# Patient Record
Sex: FEMALE | Race: OTHER | HISPANIC OR LATINO | URBAN - METROPOLITAN AREA
[De-identification: names, ages, dates, MRNs, and addresses within clinical notes are randomized per-mention and may not be internally consistent; named-entity substitution may affect disease eponyms.]

---

## 2019-04-17 ENCOUNTER — EMERGENCY (EMERGENCY)
Facility: HOSPITAL | Age: 29
LOS: 0 days | Discharge: HOME | End: 2019-04-17
Attending: EMERGENCY MEDICINE | Admitting: EMERGENCY MEDICINE
Payer: MEDICAID

## 2019-04-17 VITALS
SYSTOLIC BLOOD PRESSURE: 107 MMHG | HEART RATE: 74 BPM | DIASTOLIC BLOOD PRESSURE: 71 MMHG | OXYGEN SATURATION: 100 % | TEMPERATURE: 98 F | RESPIRATION RATE: 18 BRPM

## 2019-04-17 VITALS — HEIGHT: 60 IN | WEIGHT: 138.89 LBS

## 2019-04-17 DIAGNOSIS — S62.661B NONDISPLACED FRACTURE OF DISTAL PHALANX OF LEFT INDEX FINGER, INITIAL ENCOUNTER FOR OPEN FRACTURE: ICD-10-CM
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
## 2019-04-17 PROCEDURE — 29130 APPL FINGER SPLINT STATIC: CPT | Mod: F1

## 2019-04-17 PROCEDURE — 73140 X-RAY EXAM OF FINGER(S): CPT | Mod: 26,LT

## 2019-04-17 PROCEDURE — 99283 EMERGENCY DEPT VISIT LOW MDM: CPT | Mod: 25

## 2019-04-17 RX ORDER — TETANUS TOXOID, REDUCED DIPHTHERIA TOXOID AND ACELLULAR PERTUSSIS VACCINE, ADSORBED 5; 2.5; 8; 8; 2.5 [IU]/.5ML; [IU]/.5ML; UG/.5ML; UG/.5ML; UG/.5ML
0.5 SUSPENSION INTRAMUSCULAR ONCE
Qty: 0 | Refills: 0 | Status: COMPLETED | OUTPATIENT
Start: 2019-04-17 | End: 2019-04-17

## 2019-04-17 RX ORDER — CEPHALEXIN 500 MG
1 CAPSULE ORAL
Qty: 21 | Refills: 0 | OUTPATIENT
Start: 2019-04-17 | End: 2019-04-23

## 2019-04-17 RX ORDER — CEPHALEXIN 500 MG
500 CAPSULE ORAL ONCE
Qty: 0 | Refills: 0 | Status: COMPLETED | OUTPATIENT
Start: 2019-04-17 | End: 2019-04-17

## 2019-04-17 RX ADMIN — TETANUS TOXOID, REDUCED DIPHTHERIA TOXOID AND ACELLULAR PERTUSSIS VACCINE, ADSORBED 0.5 MILLILITER(S): 5; 2.5; 8; 8; 2.5 SUSPENSION INTRAMUSCULAR at 21:36

## 2019-04-17 RX ADMIN — Medication 500 MILLIGRAM(S): at 21:53

## 2019-04-17 NOTE — ED STATDOCS - ATTENDING CONTRIBUTION TO CARE
Attending Contribution to Care: I, Suzan Rand, performed the initial face to face bedside interview with this patient regarding history of present illness, review of symptoms and relevant past medical, social and family history.  I completed an independent physical examination.  I was the initial provider who evaluated this patient and the history, physical, and MDM reflect this intial assessment. I have signed out the follow up of any pending tests after the original (i.e. labs, radiological studies) to the ACP with instructions to review any with instructions to review any concerning findings to me prior to discharge.  I have communicated the patient’s plan of care and disposition with the ACP.

## 2019-04-17 NOTE — ED STATDOCS - CARE PLAN
Principal Discharge DX:	Open nondisplaced fracture of distal phalanx of left index finger, initial encounter

## 2019-04-17 NOTE — ED STATDOCS - SKIN, MLM
+left index finger with subungual hematoma less than 50%, distal swelling with lac and visualized metal piece.

## 2019-04-17 NOTE — ED STATDOCS - PROGRESS NOTE DETAILS
XR with L 2nd finger distal tuft fx, cleaned, no active bleeding (injury >24 hours ago) dressed placed in finger splint, Tdap given, will start keflex, wound care and S&S of infection discussed, plan to d/c home with outpt f/u with ortho hand, pt agreeable to d/c and plan of care, all questions answered, return precautions given   [id number] used   Payton Amezquita PA-C

## 2019-04-17 NOTE — ED STATDOCS - CLINICAL SUMMARY MEDICAL DECISION MAKING FREE TEXT BOX
Plan for X-Ray, lac repair. Plan for X-Ray, lac repair.  XR with 2nd finger distal tuft fx, cleaned, splinted, plan to d/c home with ortho hand f/u

## 2019-04-17 NOTE — ED ADULT NURSE NOTE - NSIMPLEMENTINTERV_GEN_ALL_ED
Implemented All Universal Safety Interventions:  [place] to call system. Call bell, personal items and telephone within reach. Instruct patient to call for assistance. Room bathroom lighting operational. Non-slip footwear when patient is off stretcher. Physically safe environment: no spills, clutter or unnecessary equipment. Stretcher in lowest position, wheels locked, appropriate side rails in place.

## 2019-04-17 NOTE — ED STATDOCS - OBJECTIVE STATEMENT
27 y/o female with no pertinent PMHx presents to the ED c/o staple injury to left index finger with laceration that happened last night. A box with a staple fell onto pt's hand and cut it and there is a visible metal piece in finger. Tetanus is not UTD.

## 2019-06-06 ENCOUNTER — EMERGENCY (EMERGENCY)
Facility: HOSPITAL | Age: 29
LOS: 0 days | Discharge: HOME | End: 2019-06-06
Attending: EMERGENCY MEDICINE | Admitting: EMERGENCY MEDICINE
Payer: MEDICAID

## 2019-06-06 VITALS — WEIGHT: 139.99 LBS | HEIGHT: 61 IN

## 2019-06-06 VITALS
RESPIRATION RATE: 17 BRPM | SYSTOLIC BLOOD PRESSURE: 94 MMHG | HEART RATE: 70 BPM | OXYGEN SATURATION: 98 % | DIASTOLIC BLOOD PRESSURE: 66 MMHG | TEMPERATURE: 97 F

## 2019-06-06 DIAGNOSIS — R11.2 NAUSEA WITH VOMITING, UNSPECIFIED: ICD-10-CM

## 2019-06-06 DIAGNOSIS — Z90.49 ACQUIRED ABSENCE OF OTHER SPECIFIED PARTS OF DIGESTIVE TRACT: Chronic | ICD-10-CM

## 2019-06-06 DIAGNOSIS — R10.13 EPIGASTRIC PAIN: ICD-10-CM

## 2019-06-06 DIAGNOSIS — K52.9 NONINFECTIVE GASTROENTERITIS AND COLITIS, UNSPECIFIED: ICD-10-CM

## 2019-06-06 LAB
ALBUMIN SERPL ELPH-MCNC: 3.6 G/DL — SIGNIFICANT CHANGE UP (ref 3.3–5)
ALP SERPL-CCNC: 67 U/L — SIGNIFICANT CHANGE UP (ref 40–120)
ALT FLD-CCNC: 20 U/L — SIGNIFICANT CHANGE UP (ref 12–78)
ANION GAP SERPL CALC-SCNC: 10 MMOL/L — SIGNIFICANT CHANGE UP (ref 5–17)
APPEARANCE UR: CLEAR — SIGNIFICANT CHANGE UP
AST SERPL-CCNC: 17 U/L — SIGNIFICANT CHANGE UP (ref 15–37)
BACTERIA # UR AUTO: ABNORMAL
BASOPHILS # BLD AUTO: 0.04 K/UL — SIGNIFICANT CHANGE UP (ref 0–0.2)
BASOPHILS NFR BLD AUTO: 0.3 % — SIGNIFICANT CHANGE UP (ref 0–2)
BILIRUB SERPL-MCNC: 0.2 MG/DL — SIGNIFICANT CHANGE UP (ref 0.2–1.2)
BILIRUB UR-MCNC: NEGATIVE — SIGNIFICANT CHANGE UP
BUN SERPL-MCNC: 7 MG/DL — SIGNIFICANT CHANGE UP (ref 7–23)
CALCIUM SERPL-MCNC: 9.3 MG/DL — SIGNIFICANT CHANGE UP (ref 8.5–10.1)
CHLORIDE SERPL-SCNC: 106 MMOL/L — SIGNIFICANT CHANGE UP (ref 96–108)
CO2 SERPL-SCNC: 23 MMOL/L — SIGNIFICANT CHANGE UP (ref 22–31)
COLOR SPEC: YELLOW — SIGNIFICANT CHANGE UP
CREAT SERPL-MCNC: 0.66 MG/DL — SIGNIFICANT CHANGE UP (ref 0.5–1.3)
DIFF PNL FLD: NEGATIVE — SIGNIFICANT CHANGE UP
EOSINOPHIL # BLD AUTO: 0.14 K/UL — SIGNIFICANT CHANGE UP (ref 0–0.5)
EOSINOPHIL NFR BLD AUTO: 1 % — SIGNIFICANT CHANGE UP (ref 0–6)
EPI CELLS # UR: SIGNIFICANT CHANGE UP
GLUCOSE SERPL-MCNC: 104 MG/DL — HIGH (ref 70–99)
GLUCOSE UR QL: NEGATIVE MG/DL — SIGNIFICANT CHANGE UP
HCG SERPL-ACNC: <1 MIU/ML — SIGNIFICANT CHANGE UP
HCT VFR BLD CALC: 35 % — SIGNIFICANT CHANGE UP (ref 34.5–45)
HGB BLD-MCNC: 12 G/DL — SIGNIFICANT CHANGE UP (ref 11.5–15.5)
IMM GRANULOCYTES NFR BLD AUTO: 0.4 % — SIGNIFICANT CHANGE UP (ref 0–1.5)
KETONES UR-MCNC: NEGATIVE — SIGNIFICANT CHANGE UP
LEUKOCYTE ESTERASE UR-ACNC: ABNORMAL
LIDOCAIN IGE QN: 100 U/L — SIGNIFICANT CHANGE UP (ref 73–393)
LYMPHOCYTES # BLD AUTO: 33.8 % — SIGNIFICANT CHANGE UP (ref 13–44)
LYMPHOCYTES # BLD AUTO: 4.54 K/UL — HIGH (ref 1–3.3)
MCHC RBC-ENTMCNC: 30.4 PG — SIGNIFICANT CHANGE UP (ref 27–34)
MCHC RBC-ENTMCNC: 34.3 GM/DL — SIGNIFICANT CHANGE UP (ref 32–36)
MCV RBC AUTO: 88.6 FL — SIGNIFICANT CHANGE UP (ref 80–100)
MONOCYTES # BLD AUTO: 1.11 K/UL — HIGH (ref 0–0.9)
MONOCYTES NFR BLD AUTO: 8.3 % — SIGNIFICANT CHANGE UP (ref 2–14)
NEUTROPHILS # BLD AUTO: 7.57 K/UL — HIGH (ref 1.8–7.4)
NEUTROPHILS NFR BLD AUTO: 56.2 % — SIGNIFICANT CHANGE UP (ref 43–77)
NITRITE UR-MCNC: NEGATIVE — SIGNIFICANT CHANGE UP
PH UR: 6.5 — SIGNIFICANT CHANGE UP (ref 5–8)
PLATELET # BLD AUTO: 384 K/UL — SIGNIFICANT CHANGE UP (ref 150–400)
POTASSIUM SERPL-MCNC: 3.3 MMOL/L — LOW (ref 3.5–5.3)
POTASSIUM SERPL-SCNC: 3.3 MMOL/L — LOW (ref 3.5–5.3)
PROT SERPL-MCNC: 7.9 GM/DL — SIGNIFICANT CHANGE UP (ref 6–8.3)
PROT UR-MCNC: NEGATIVE MG/DL — SIGNIFICANT CHANGE UP
RBC # BLD: 3.95 M/UL — SIGNIFICANT CHANGE UP (ref 3.8–5.2)
RBC # FLD: 12 % — SIGNIFICANT CHANGE UP (ref 10.3–14.5)
RBC CASTS # UR COMP ASSIST: SIGNIFICANT CHANGE UP /HPF (ref 0–4)
SODIUM SERPL-SCNC: 139 MMOL/L — SIGNIFICANT CHANGE UP (ref 135–145)
SP GR SPEC: 1.01 — SIGNIFICANT CHANGE UP (ref 1.01–1.02)
UROBILINOGEN FLD QL: NEGATIVE MG/DL — SIGNIFICANT CHANGE UP
WBC # BLD: 13.45 K/UL — HIGH (ref 3.8–10.5)
WBC # FLD AUTO: 13.45 K/UL — HIGH (ref 3.8–10.5)
WBC UR QL: SIGNIFICANT CHANGE UP

## 2019-06-06 PROCEDURE — 99284 EMERGENCY DEPT VISIT MOD MDM: CPT

## 2019-06-06 PROCEDURE — 74177 CT ABD & PELVIS W/CONTRAST: CPT | Mod: 26

## 2019-06-06 PROCEDURE — 93010 ELECTROCARDIOGRAM REPORT: CPT

## 2019-06-06 RX ORDER — ONDANSETRON 8 MG/1
4 TABLET, FILM COATED ORAL ONCE
Refills: 0 | Status: COMPLETED | OUTPATIENT
Start: 2019-06-06 | End: 2019-06-06

## 2019-06-06 RX ORDER — SODIUM CHLORIDE 9 MG/ML
1000 INJECTION INTRAMUSCULAR; INTRAVENOUS; SUBCUTANEOUS ONCE
Refills: 0 | Status: COMPLETED | OUTPATIENT
Start: 2019-06-06 | End: 2019-06-06

## 2019-06-06 RX ORDER — FAMOTIDINE 10 MG/ML
20 INJECTION INTRAVENOUS ONCE
Refills: 0 | Status: COMPLETED | OUTPATIENT
Start: 2019-06-06 | End: 2019-06-06

## 2019-06-06 RX ORDER — KETOROLAC TROMETHAMINE 30 MG/ML
30 SYRINGE (ML) INJECTION ONCE
Refills: 0 | Status: DISCONTINUED | OUTPATIENT
Start: 2019-06-06 | End: 2019-06-06

## 2019-06-06 RX ADMIN — SODIUM CHLORIDE 1000 MILLILITER(S): 9 INJECTION INTRAMUSCULAR; INTRAVENOUS; SUBCUTANEOUS at 01:11

## 2019-06-06 RX ADMIN — Medication 30 MILLILITER(S): at 01:11

## 2019-06-06 RX ADMIN — Medication 30 MILLIGRAM(S): at 04:22

## 2019-06-06 RX ADMIN — FAMOTIDINE 20 MILLIGRAM(S): 10 INJECTION INTRAVENOUS at 01:11

## 2019-06-06 RX ADMIN — ONDANSETRON 4 MILLIGRAM(S): 8 TABLET, FILM COATED ORAL at 01:10

## 2019-06-06 RX ADMIN — SODIUM CHLORIDE 1000 MILLILITER(S): 9 INJECTION INTRAMUSCULAR; INTRAVENOUS; SUBCUTANEOUS at 02:11

## 2019-06-06 NOTE — ED PROVIDER NOTE - OBJECTIVE STATEMENT
29 yo female with ho cholecystectomy pw epigastric pain with nausea and no vmoiting for 1 day no fever. no known sick contacts

## 2019-06-06 NOTE — ED ADULT NURSE NOTE - OBJECTIVE STATEMENT
Pt presents to ED c/o epigastric pain that radiated to the back, Nausea diarrhea which started around 9pm. Pt reports fainting at home. Denies SOB, dizziness, fever chills.

## 2019-06-06 NOTE — ED PROVIDER NOTE - CARE PROVIDER_API CALL
Mai Gastelum)  Gastroenterology; Internal Medicine  [address] [place] [address]  Phone: [phone]  Fax: [phone]  Follow Up Time:

## 2019-06-06 NOTE — ED ADULT TRIAGE NOTE - CHIEF COMPLAINT QUOTE
pt c/o abdominal pain that radiates to her back. +nausea +vomiting. pt denies fevers at home. pt last took Acetaminophen around 8pm, no relief.

## 2019-06-07 PROBLEM — Z78.9 OTHER SPECIFIED HEALTH STATUS: Chronic | Status: ACTIVE | Noted: 2019-04-22

## 2019-11-01 ENCOUNTER — EMERGENCY (EMERGENCY)
Facility: HOSPITAL | Age: 29
LOS: 0 days | Discharge: HOME | End: 2019-11-01
Attending: FAMILY MEDICINE
Payer: MEDICAID

## 2019-11-01 VITALS — WEIGHT: 149.91 LBS

## 2019-11-01 VITALS
RESPIRATION RATE: 18 BRPM | OXYGEN SATURATION: 100 % | HEART RATE: 60 BPM | TEMPERATURE: 99 F | SYSTOLIC BLOOD PRESSURE: 107 MMHG

## 2019-11-01 DIAGNOSIS — Z87.19 PERSONAL HISTORY OF OTHER DISEASES OF THE DIGESTIVE SYSTEM: ICD-10-CM

## 2019-11-01 DIAGNOSIS — R10.13 EPIGASTRIC PAIN: ICD-10-CM

## 2019-11-01 DIAGNOSIS — M54.9 DORSALGIA, UNSPECIFIED: ICD-10-CM

## 2019-11-01 DIAGNOSIS — R11.2 NAUSEA WITH VOMITING, UNSPECIFIED: ICD-10-CM

## 2019-11-01 DIAGNOSIS — Z90.49 ACQUIRED ABSENCE OF OTHER SPECIFIED PARTS OF DIGESTIVE TRACT: Chronic | ICD-10-CM

## 2019-11-01 LAB
ALBUMIN SERPL ELPH-MCNC: 3.9 G/DL — SIGNIFICANT CHANGE UP (ref 3.3–5)
ALP SERPL-CCNC: 66 U/L — SIGNIFICANT CHANGE UP (ref 40–120)
ALT FLD-CCNC: 27 U/L — SIGNIFICANT CHANGE UP (ref 12–78)
ANION GAP SERPL CALC-SCNC: 4 MMOL/L — LOW (ref 5–17)
APPEARANCE UR: CLEAR — SIGNIFICANT CHANGE UP
APTT BLD: 37.4 SEC — HIGH (ref 27.5–36.3)
AST SERPL-CCNC: 28 U/L — SIGNIFICANT CHANGE UP (ref 15–37)
BASOPHILS # BLD AUTO: 0.05 K/UL — SIGNIFICANT CHANGE UP (ref 0–0.2)
BASOPHILS NFR BLD AUTO: 0.4 % — SIGNIFICANT CHANGE UP (ref 0–2)
BILIRUB SERPL-MCNC: 0.2 MG/DL — SIGNIFICANT CHANGE UP (ref 0.2–1.2)
BILIRUB UR-MCNC: NEGATIVE — SIGNIFICANT CHANGE UP
BUN SERPL-MCNC: 8 MG/DL — SIGNIFICANT CHANGE UP (ref 7–23)
CALCIUM SERPL-MCNC: 8.3 MG/DL — LOW (ref 8.5–10.1)
CHLORIDE SERPL-SCNC: 107 MMOL/L — SIGNIFICANT CHANGE UP (ref 96–108)
CO2 SERPL-SCNC: 28 MMOL/L — SIGNIFICANT CHANGE UP (ref 22–31)
COLOR SPEC: YELLOW — SIGNIFICANT CHANGE UP
CREAT SERPL-MCNC: 0.77 MG/DL — SIGNIFICANT CHANGE UP (ref 0.5–1.3)
DIFF PNL FLD: NEGATIVE — SIGNIFICANT CHANGE UP
EOSINOPHIL # BLD AUTO: 0.2 K/UL — SIGNIFICANT CHANGE UP (ref 0–0.5)
EOSINOPHIL NFR BLD AUTO: 1.8 % — SIGNIFICANT CHANGE UP (ref 0–6)
GLUCOSE SERPL-MCNC: 89 MG/DL — SIGNIFICANT CHANGE UP (ref 70–99)
GLUCOSE UR QL: NEGATIVE MG/DL — SIGNIFICANT CHANGE UP
HCG UR QL: NEGATIVE — SIGNIFICANT CHANGE UP
HCT VFR BLD CALC: 37 % — SIGNIFICANT CHANGE UP (ref 34.5–45)
HGB BLD-MCNC: 12.3 G/DL — SIGNIFICANT CHANGE UP (ref 11.5–15.5)
IMM GRANULOCYTES NFR BLD AUTO: 0.1 % — SIGNIFICANT CHANGE UP (ref 0–1.5)
INR BLD: 0.98 RATIO — SIGNIFICANT CHANGE UP (ref 0.88–1.16)
KETONES UR-MCNC: NEGATIVE — SIGNIFICANT CHANGE UP
LEUKOCYTE ESTERASE UR-ACNC: NEGATIVE — SIGNIFICANT CHANGE UP
LIDOCAIN IGE QN: 103 U/L — SIGNIFICANT CHANGE UP (ref 73–393)
LYMPHOCYTES # BLD AUTO: 4.65 K/UL — HIGH (ref 1–3.3)
LYMPHOCYTES # BLD AUTO: 41.7 % — SIGNIFICANT CHANGE UP (ref 13–44)
MCHC RBC-ENTMCNC: 29.9 PG — SIGNIFICANT CHANGE UP (ref 27–34)
MCHC RBC-ENTMCNC: 33.2 GM/DL — SIGNIFICANT CHANGE UP (ref 32–36)
MCV RBC AUTO: 89.8 FL — SIGNIFICANT CHANGE UP (ref 80–100)
MONOCYTES # BLD AUTO: 0.79 K/UL — SIGNIFICANT CHANGE UP (ref 0–0.9)
MONOCYTES NFR BLD AUTO: 7.1 % — SIGNIFICANT CHANGE UP (ref 2–14)
NEUTROPHILS # BLD AUTO: 5.46 K/UL — SIGNIFICANT CHANGE UP (ref 1.8–7.4)
NEUTROPHILS NFR BLD AUTO: 48.9 % — SIGNIFICANT CHANGE UP (ref 43–77)
NITRITE UR-MCNC: NEGATIVE — SIGNIFICANT CHANGE UP
PH UR: 7 — SIGNIFICANT CHANGE UP (ref 5–8)
PLATELET # BLD AUTO: 387 K/UL — SIGNIFICANT CHANGE UP (ref 150–400)
POTASSIUM SERPL-MCNC: 3.8 MMOL/L — SIGNIFICANT CHANGE UP (ref 3.5–5.3)
POTASSIUM SERPL-SCNC: 3.8 MMOL/L — SIGNIFICANT CHANGE UP (ref 3.5–5.3)
PROT SERPL-MCNC: 8.2 GM/DL — SIGNIFICANT CHANGE UP (ref 6–8.3)
PROT UR-MCNC: NEGATIVE MG/DL — SIGNIFICANT CHANGE UP
PROTHROM AB SERPL-ACNC: 10.9 SEC — SIGNIFICANT CHANGE UP (ref 10–12.9)
RBC # BLD: 4.12 M/UL — SIGNIFICANT CHANGE UP (ref 3.8–5.2)
RBC # FLD: 13.4 % — SIGNIFICANT CHANGE UP (ref 10.3–14.5)
SODIUM SERPL-SCNC: 139 MMOL/L — SIGNIFICANT CHANGE UP (ref 135–145)
SP GR SPEC: 1.01 — SIGNIFICANT CHANGE UP (ref 1.01–1.02)
UROBILINOGEN FLD QL: NEGATIVE MG/DL — SIGNIFICANT CHANGE UP
WBC # BLD: 11.16 K/UL — HIGH (ref 3.8–10.5)
WBC # FLD AUTO: 11.16 K/UL — HIGH (ref 3.8–10.5)

## 2019-11-01 PROCEDURE — 80053 COMPREHEN METABOLIC PANEL: CPT

## 2019-11-01 PROCEDURE — 99285 EMERGENCY DEPT VISIT HI MDM: CPT

## 2019-11-01 PROCEDURE — 81003 URINALYSIS AUTO W/O SCOPE: CPT

## 2019-11-01 PROCEDURE — 71045 X-RAY EXAM CHEST 1 VIEW: CPT | Mod: 26

## 2019-11-01 PROCEDURE — 86900 BLOOD TYPING SEROLOGIC ABO: CPT

## 2019-11-01 PROCEDURE — 81025 URINE PREGNANCY TEST: CPT

## 2019-11-01 PROCEDURE — 87086 URINE CULTURE/COLONY COUNT: CPT

## 2019-11-01 PROCEDURE — 74177 CT ABD & PELVIS W/CONTRAST: CPT

## 2019-11-01 PROCEDURE — 99284 EMERGENCY DEPT VISIT MOD MDM: CPT | Mod: 25

## 2019-11-01 PROCEDURE — 96375 TX/PRO/DX INJ NEW DRUG ADDON: CPT | Mod: 59

## 2019-11-01 PROCEDURE — 71045 X-RAY EXAM CHEST 1 VIEW: CPT

## 2019-11-01 PROCEDURE — 86850 RBC ANTIBODY SCREEN: CPT

## 2019-11-01 PROCEDURE — 85730 THROMBOPLASTIN TIME PARTIAL: CPT

## 2019-11-01 PROCEDURE — 96374 THER/PROPH/DIAG INJ IV PUSH: CPT | Mod: 59

## 2019-11-01 PROCEDURE — 85610 PROTHROMBIN TIME: CPT

## 2019-11-01 PROCEDURE — 74177 CT ABD & PELVIS W/CONTRAST: CPT | Mod: 26

## 2019-11-01 PROCEDURE — 86901 BLOOD TYPING SEROLOGIC RH(D): CPT

## 2019-11-01 PROCEDURE — 85025 COMPLETE CBC W/AUTO DIFF WBC: CPT

## 2019-11-01 PROCEDURE — 36415 COLL VENOUS BLD VENIPUNCTURE: CPT

## 2019-11-01 PROCEDURE — 83690 ASSAY OF LIPASE: CPT

## 2019-11-01 RX ORDER — FAMOTIDINE 10 MG/ML
20 INJECTION INTRAVENOUS ONCE
Refills: 0 | Status: COMPLETED | OUTPATIENT
Start: 2019-11-01 | End: 2019-11-01

## 2019-11-01 RX ORDER — KETOROLAC TROMETHAMINE 30 MG/ML
30 SYRINGE (ML) INJECTION ONCE
Refills: 0 | Status: DISCONTINUED | OUTPATIENT
Start: 2019-11-01 | End: 2019-11-01

## 2019-11-01 RX ORDER — ONDANSETRON 8 MG/1
4 TABLET, FILM COATED ORAL ONCE
Refills: 0 | Status: COMPLETED | OUTPATIENT
Start: 2019-11-01 | End: 2019-11-01

## 2019-11-01 RX ORDER — FAMOTIDINE 10 MG/ML
1 INJECTION INTRAVENOUS
Qty: 28 | Refills: 0
Start: 2019-11-01 | End: 2019-11-14

## 2019-11-01 RX ADMIN — FAMOTIDINE 20 MILLIGRAM(S): 10 INJECTION INTRAVENOUS at 17:28

## 2019-11-01 RX ADMIN — ONDANSETRON 4 MILLIGRAM(S): 8 TABLET, FILM COATED ORAL at 17:28

## 2019-11-01 NOTE — ED STATDOCS - PROGRESS NOTE DETAILS
Patient initially seen and evaluated with ED attending at intake.  She is reporting some epigastric pain radiating around to her mid back.  She feels better s/p pepcid and there is no acute findings on her CT.  Suspect GERD or gastritis.  Will d/c with rx for pepcid and PMD f/u -Anthony Taylor PA-C

## 2019-11-01 NOTE — ED STATDOCS - CARE PROVIDER_API CALL
Mai Gastelum)  Gastroenterology; Internal Medicine  [address] [place] [address]  Phone: [phone]  Fax: [phone]  Follow Up Time:

## 2019-11-01 NOTE — ED ADULT TRIAGE NOTE - CHIEF COMPLAINT QUOTE
pt presents to ED due to episode of forgetfulness pt was unable to recall how she drove from [place] to [place] when she got to [employer] she called a friend and was unable to recall some of the activities pt A&o x 3 pt presents to ED due to abdominal and back pain since last night no pain meds today

## 2019-11-01 NOTE — ED STATDOCS - PATIENT PORTAL LINK FT
You can access the FollowMyHealth Patient Portal offered by [facility] by registering at the following website: [email or web address]. By joining [employer]’s FollowMyHealth portal, you will also be able to view your health information using other applications (apps) compatible with our system.

## 2019-11-01 NOTE — ED STATDOCS - CLINICAL SUMMARY MEDICAL DECISION MAKING FREE TEXT BOX
pt with hx of cholecystectomy here with epigastric pain radiating to left flank r/o gastritis, colic, labs, IV fluids, Pepcid, and CT

## 2019-11-01 NOTE — ED STATDOCS - GASTROINTESTINAL, MLM
abdomen soft, and non-distended. Bowel sounds present. +left CVA tenderness and epigastric tenderness

## 2019-11-01 NOTE — ED STATDOCS - OBJECTIVE STATEMENT
30 y/o female with no pertinent PMHx presents to the ED c/o epigastric pain radiating to back since last night. +nausea, vomiting. Denies dysuria, fever, diarrhea. States pain is a 9/10. No hx of injury. Had nausea and vomiting 2 nights ago and states abdominal pain started yesterday. States she had similar abd pain several years ago and had to have a cholecystectomy. Took Tylenol with minimal relief. LMP was yesterday with irregular menses. Non smoker. No current PMD.

## 2019-11-01 NOTE — ED STATDOCS - CARE PROVIDERS DIRECT ADDRESSES
[email or web address]

## 2019-11-01 NOTE — ED STATDOCS - NSFOLLOWUPINSTRUCTIONS_ED_ALL_ED_FT
Gastritis - Adultos  Gastritis, Adult  Image   La gastritis es israel hinchazón (inflamación) del estómago. La gastritis puede desarrollarse rápidamente (aguda). También puede desarrollarse lentamente con el transcurso del tiempo (crónica). Es importante recibir ayuda para esta afección. Si no obtiene ayuda, el estómago puede sangrar y puede desarrollar llagas (úlceras) en el estómago.  ¿Cuáles son las causas?  Esta afección puede ser causada por lo siguiente:  Gérmenes que llegan al estómago.Beber alcohol en exceso.Los medicamentos que yamilex.Demasiado ácido en el estómago.[place] enfermedad del intestino o del estómago.Estrés.[place] reacción alérgica.Enfermedad de Crohn.Algunos tratamientos para el cáncer (radiación).A veces, se desconoce la causa de esta afección.  ¿Cuáles son los signos o los síntomas?  Los síntomas de esta afección incluyen los siguientes:  Dolor en el estómago.[place] sensación de ardor en el estómago.Ganas de vomitar (náuseas).Vómitos.Sensación de estar demasiado lleno luego de comer.Pérdida de peso.Mal aliento.Vomitar maribel.Maribel en las heces.¿Cómo se diagnostica?  Esta afección puede diagnosticarse en función de lo siguiente:  Los antecedentes médicos y síntomas.Un examen físico.Estudios. Estos pueden incluir los siguientes:  Análisis de maribel.Pruebas de heces.Un procedimiento para observar el interior del estómago (endoscopía juany).Un estudio en el cual se yamilex israel muestra de tejido para analizarlo (biopsia).¿Cómo se trata?  El tratamiento de esta afección depende de la causa. Es posible que le administren lo siguiente:  Antibióticos si la afección es causada por gérmenes.Bloqueadores H2 y medicamentos similares, si la afección fue causada por israel gran cantidad de ácido.Siga estas indicaciones en forrest casa:  Medicamentos     [place] los medicamentos de venta jorden y los recetados solamente radha se lo haya indicado el médico.Si le recetaron un antibiótico, tómelo radha se lo haya indicado el médico. No deje de tomarlo aunque comience a sentirse mejor.Comida y bebida     Image   Ifrah comidas pequeñas y frecuentes en lugar de comidas abundantes.Evite los alimentos y las bebidas que intensifican los síntomas.Arvin suficiente líquido para mantener la orina de color amarillo pálido.Consumo de alcohol     No arvin alcohol si:  El médico le indica que no lo ifrah.Está embarazada, puede estar embarazada o está tratando de quedar embarazada.Si prashanth alcohol:  Limite forrest uso a las siguientes medidas:  De 0 a 1 medida por día para las mujeres.De 0 a 2 medidas por día para los hombres.Esté atento a la cantidad de alcohol que hay en las bebidas que yamilex. En los [place], israel medida equivale a israel botella de cerveza de 12 oz (355 ml), un vaso de vino de 5 oz (148 ml) o un vaso de israel bebida alcohólica de juany graduación de 1½ oz (44 ml).Indicaciones generales     [place] con el médico sobre maneras de controlar el estrés. Puede realizar ejercicios de respiración profunda, meditación o yoga.No fume ni consuma productos que contengan nicotina ni tabaco. Si necesita ayuda para dejar de fumar, consulte al médico.Concurra a todas las visitas de seguimiento radha se lo haya indicado el médico. [place] es importante.Comuníquese con un médico si:  Karely síntomas empeoran.Los síntomas desaparecen y luego reaparecen.Solicite ayuda inmediatamente si:  Vomita maribel o israel sustancia parecida a los granos de café.La materia fecal es efra o de color ruelas oscuro.Vomita cada vez que intenta reese líquidos.El dolor de estómago empeora.Tiene fiebre.No se siente mejor luego de israel semana.Resumen  La gastritis es israel hinchazón (inflamación) del estómago.Debe obtener ayuda para tratar esta afección. Si no obtiene ayuda, el estómago puede sangrar y pueden formarse llagas (úlceras).Esta afección se diagnostica mediante los antecedentes médicos, un examen físico o estudios.Puede recibir tratamiento con medicamentos para los gérmenes o medicamentos para bloquear la presencia de demasiada cantidad de ácido en el estómago.Esta información no tiene radha fin reemplazar el consejo del médico. Asegúrese de hacerle al médico cualquier pregunta que tenga.

## 2019-11-02 LAB
CULTURE RESULTS: SIGNIFICANT CHANGE UP
SPECIMEN SOURCE: SIGNIFICANT CHANGE UP

## 2020-09-09 ENCOUNTER — EMERGENCY (EMERGENCY)
Facility: HOSPITAL | Age: 30
LOS: 0 days | Discharge: HOME | End: 2020-09-10
Attending: EMERGENCY MEDICINE
Payer: MEDICAID

## 2020-09-09 VITALS
SYSTOLIC BLOOD PRESSURE: 113 MMHG | OXYGEN SATURATION: 100 % | HEART RATE: 65 BPM | DIASTOLIC BLOOD PRESSURE: 79 MMHG | RESPIRATION RATE: 18 BRPM | WEIGHT: 139.99 LBS | HEIGHT: 62 IN | TEMPERATURE: 99 F

## 2020-09-09 DIAGNOSIS — R10.12 LEFT UPPER QUADRANT PAIN: ICD-10-CM

## 2020-09-09 DIAGNOSIS — R42 DIZZINESS AND GIDDINESS: ICD-10-CM

## 2020-09-09 DIAGNOSIS — R11.0 NAUSEA: ICD-10-CM

## 2020-09-09 DIAGNOSIS — Z90.49 ACQUIRED ABSENCE OF OTHER SPECIFIED PARTS OF DIGESTIVE TRACT: ICD-10-CM

## 2020-09-09 DIAGNOSIS — Z90.49 ACQUIRED ABSENCE OF OTHER SPECIFIED PARTS OF DIGESTIVE TRACT: Chronic | ICD-10-CM

## 2020-09-09 DIAGNOSIS — R07.9 CHEST PAIN, UNSPECIFIED: ICD-10-CM

## 2020-09-09 LAB
BASOPHILS # BLD AUTO: 0.04 K/UL — SIGNIFICANT CHANGE UP (ref 0–0.2)
BASOPHILS NFR BLD AUTO: 0.3 % — SIGNIFICANT CHANGE UP (ref 0–2)
EOSINOPHIL # BLD AUTO: 0.2 K/UL — SIGNIFICANT CHANGE UP (ref 0–0.5)
EOSINOPHIL NFR BLD AUTO: 1.6 % — SIGNIFICANT CHANGE UP (ref 0–6)
HCT VFR BLD CALC: 35.3 % — SIGNIFICANT CHANGE UP (ref 34.5–45)
HGB BLD-MCNC: 12.1 G/DL — SIGNIFICANT CHANGE UP (ref 11.5–15.5)
IMM GRANULOCYTES NFR BLD AUTO: 0.2 % — SIGNIFICANT CHANGE UP (ref 0–1.5)
LYMPHOCYTES # BLD AUTO: 34.6 % — SIGNIFICANT CHANGE UP (ref 13–44)
LYMPHOCYTES # BLD AUTO: 4.23 K/UL — HIGH (ref 1–3.3)
MCHC RBC-ENTMCNC: 30.6 PG — SIGNIFICANT CHANGE UP (ref 27–34)
MCHC RBC-ENTMCNC: 34.3 GM/DL — SIGNIFICANT CHANGE UP (ref 32–36)
MCV RBC AUTO: 89.4 FL — SIGNIFICANT CHANGE UP (ref 80–100)
MONOCYTES # BLD AUTO: 0.9 K/UL — SIGNIFICANT CHANGE UP (ref 0–0.9)
MONOCYTES NFR BLD AUTO: 7.4 % — SIGNIFICANT CHANGE UP (ref 2–14)
NEUTROPHILS # BLD AUTO: 6.83 K/UL — SIGNIFICANT CHANGE UP (ref 1.8–7.4)
NEUTROPHILS NFR BLD AUTO: 55.9 % — SIGNIFICANT CHANGE UP (ref 43–77)
PLATELET # BLD AUTO: 333 K/UL — SIGNIFICANT CHANGE UP (ref 150–400)
RBC # BLD: 3.95 M/UL — SIGNIFICANT CHANGE UP (ref 3.8–5.2)
RBC # FLD: 12.5 % — SIGNIFICANT CHANGE UP (ref 10.3–14.5)
WBC # BLD: 12.23 K/UL — HIGH (ref 3.8–10.5)
WBC # FLD AUTO: 12.23 K/UL — HIGH (ref 3.8–10.5)

## 2020-09-09 PROCEDURE — 84484 ASSAY OF TROPONIN QUANT: CPT

## 2020-09-09 PROCEDURE — 83690 ASSAY OF LIPASE: CPT

## 2020-09-09 PROCEDURE — 96375 TX/PRO/DX INJ NEW DRUG ADDON: CPT

## 2020-09-09 PROCEDURE — 83880 ASSAY OF NATRIURETIC PEPTIDE: CPT

## 2020-09-09 PROCEDURE — 36415 COLL VENOUS BLD VENIPUNCTURE: CPT

## 2020-09-09 PROCEDURE — 96374 THER/PROPH/DIAG INJ IV PUSH: CPT | Mod: XU

## 2020-09-09 PROCEDURE — 80053 COMPREHEN METABOLIC PANEL: CPT

## 2020-09-09 PROCEDURE — 93005 ELECTROCARDIOGRAM TRACING: CPT

## 2020-09-09 PROCEDURE — 93010 ELECTROCARDIOGRAM REPORT: CPT

## 2020-09-09 PROCEDURE — 81001 URINALYSIS AUTO W/SCOPE: CPT

## 2020-09-09 PROCEDURE — 71046 X-RAY EXAM CHEST 2 VIEWS: CPT | Mod: 26

## 2020-09-09 PROCEDURE — 83735 ASSAY OF MAGNESIUM: CPT

## 2020-09-09 PROCEDURE — 84702 CHORIONIC GONADOTROPIN TEST: CPT

## 2020-09-09 PROCEDURE — 99284 EMERGENCY DEPT VISIT MOD MDM: CPT

## 2020-09-09 PROCEDURE — 74177 CT ABD & PELVIS W/CONTRAST: CPT

## 2020-09-09 PROCEDURE — 71046 X-RAY EXAM CHEST 2 VIEWS: CPT

## 2020-09-09 PROCEDURE — 85025 COMPLETE CBC W/AUTO DIFF WBC: CPT

## 2020-09-09 PROCEDURE — 99284 EMERGENCY DEPT VISIT MOD MDM: CPT | Mod: 25

## 2020-09-09 RX ORDER — KETOROLAC TROMETHAMINE 30 MG/ML
30 SYRINGE (ML) INJECTION ONCE
Refills: 0 | Status: DISCONTINUED | OUTPATIENT
Start: 2020-09-09 | End: 2020-09-09

## 2020-09-09 RX ORDER — FAMOTIDINE 10 MG/ML
20 INJECTION INTRAVENOUS ONCE
Refills: 0 | Status: COMPLETED | OUTPATIENT
Start: 2020-09-09 | End: 2020-09-09

## 2020-09-09 RX ORDER — ONDANSETRON 8 MG/1
4 TABLET, FILM COATED ORAL ONCE
Refills: 0 | Status: COMPLETED | OUTPATIENT
Start: 2020-09-09 | End: 2020-09-09

## 2020-09-09 RX ORDER — SODIUM CHLORIDE 9 MG/ML
1000 INJECTION INTRAMUSCULAR; INTRAVENOUS; SUBCUTANEOUS ONCE
Refills: 0 | Status: COMPLETED | OUTPATIENT
Start: 2020-09-09 | End: 2020-09-09

## 2020-09-09 NOTE — ED ADULT TRIAGE NOTE - CHIEF COMPLAINT QUOTE
Pt presents to er with complaints of mid-sternal chest pain radiating into her left shoulder and to her back, pain started last night, states she feels dizzy, denies cardiac history, heavy lifting.

## 2020-09-10 VITALS
TEMPERATURE: 99 F | RESPIRATION RATE: 18 BRPM | DIASTOLIC BLOOD PRESSURE: 74 MMHG | SYSTOLIC BLOOD PRESSURE: 103 MMHG | HEART RATE: 69 BPM | OXYGEN SATURATION: 99 %

## 2020-09-10 LAB
ALBUMIN SERPL ELPH-MCNC: 3.6 G/DL — SIGNIFICANT CHANGE UP (ref 3.3–5)
ALP SERPL-CCNC: 54 U/L — SIGNIFICANT CHANGE UP (ref 40–120)
ALT FLD-CCNC: 36 U/L — SIGNIFICANT CHANGE UP (ref 12–78)
ANION GAP SERPL CALC-SCNC: 4 MMOL/L — LOW (ref 5–17)
APPEARANCE UR: CLEAR — SIGNIFICANT CHANGE UP
AST SERPL-CCNC: 22 U/L — SIGNIFICANT CHANGE UP (ref 15–37)
BILIRUB SERPL-MCNC: 0.3 MG/DL — SIGNIFICANT CHANGE UP (ref 0.2–1.2)
BILIRUB UR-MCNC: NEGATIVE — SIGNIFICANT CHANGE UP
BUN SERPL-MCNC: 9 MG/DL — SIGNIFICANT CHANGE UP (ref 7–23)
CALCIUM SERPL-MCNC: 9 MG/DL — SIGNIFICANT CHANGE UP (ref 8.5–10.1)
CHLORIDE SERPL-SCNC: 108 MMOL/L — SIGNIFICANT CHANGE UP (ref 96–108)
CO2 SERPL-SCNC: 26 MMOL/L — SIGNIFICANT CHANGE UP (ref 22–31)
COLOR SPEC: YELLOW — SIGNIFICANT CHANGE UP
CREAT SERPL-MCNC: 0.71 MG/DL — SIGNIFICANT CHANGE UP (ref 0.5–1.3)
DIFF PNL FLD: NEGATIVE — SIGNIFICANT CHANGE UP
GLUCOSE SERPL-MCNC: 85 MG/DL — SIGNIFICANT CHANGE UP (ref 70–99)
GLUCOSE UR QL: NEGATIVE MG/DL — SIGNIFICANT CHANGE UP
HCG SERPL-ACNC: <1 MIU/ML — SIGNIFICANT CHANGE UP
KETONES UR-MCNC: NEGATIVE — SIGNIFICANT CHANGE UP
LEUKOCYTE ESTERASE UR-ACNC: ABNORMAL
LIDOCAIN IGE QN: 83 U/L — SIGNIFICANT CHANGE UP (ref 73–393)
MAGNESIUM SERPL-MCNC: 2 MG/DL — SIGNIFICANT CHANGE UP (ref 1.6–2.6)
NITRITE UR-MCNC: NEGATIVE — SIGNIFICANT CHANGE UP
NT-PROBNP SERPL-SCNC: 25 PG/ML — SIGNIFICANT CHANGE UP (ref 0–125)
PH UR: 7 — SIGNIFICANT CHANGE UP (ref 5–8)
POTASSIUM SERPL-MCNC: 4.1 MMOL/L — SIGNIFICANT CHANGE UP (ref 3.5–5.3)
POTASSIUM SERPL-SCNC: 4.1 MMOL/L — SIGNIFICANT CHANGE UP (ref 3.5–5.3)
PROT SERPL-MCNC: 7.8 GM/DL — SIGNIFICANT CHANGE UP (ref 6–8.3)
PROT UR-MCNC: NEGATIVE MG/DL — SIGNIFICANT CHANGE UP
SODIUM SERPL-SCNC: 138 MMOL/L — SIGNIFICANT CHANGE UP (ref 135–145)
SP GR SPEC: 1 — LOW (ref 1.01–1.02)
TROPONIN I SERPL-MCNC: <0.015 NG/ML — SIGNIFICANT CHANGE UP (ref 0.01–0.04)
UROBILINOGEN FLD QL: NEGATIVE MG/DL — SIGNIFICANT CHANGE UP

## 2020-09-10 PROCEDURE — 74177 CT ABD & PELVIS W/CONTRAST: CPT | Mod: 26

## 2020-09-10 RX ADMIN — ONDANSETRON 4 MILLIGRAM(S): 8 TABLET, FILM COATED ORAL at 00:12

## 2020-09-10 RX ADMIN — Medication 30 MILLIGRAM(S): at 00:12

## 2020-09-10 RX ADMIN — FAMOTIDINE 20 MILLIGRAM(S): 10 INJECTION INTRAVENOUS at 00:12

## 2020-09-10 RX ADMIN — SODIUM CHLORIDE 1000 MILLILITER(S): 9 INJECTION INTRAMUSCULAR; INTRAVENOUS; SUBCUTANEOUS at 00:12

## 2020-09-10 NOTE — ED ADULT NURSE NOTE - OBJECTIVE STATEMENT
Pt presents to the ED with c/o of chest pain radiating into her left shoulder and to her back, pain started last night, states she feels dizzy.

## 2020-09-10 NOTE — ED PROVIDER NOTE - PATIENT PORTAL LINK FT
You can access the FollowMyHealth Patient Portal offered by [facility] by registering at the following website: [email or web address]. By joining [employer]s FollowMyHealth portal, you will also be able to view your health information using other applications (apps) compatible with our system.

## 2020-09-10 NOTE — ED PROVIDER NOTE - OBJECTIVE STATEMENT
31 yo with left upper quadrant and left sided abdominal pain today making her nauseaous and dizzy. apears non toxic and comfortable at this time. no recent travel no diarrhea no sick contacts

## 2020-12-07 NOTE — ED PROVIDER NOTE - PRINCIPAL DIAGNOSIS
[Initial Visit] : an initial visit for [FreeTextEntry2] : RIGHT KNEE PAIN AND SHOOTING/PULLING SENSATIONS - SENT FOR NEW XRAYS  Gastroenteritis

## 2021-02-19 NOTE — ED ADULT NURSE NOTE - CAS TRG GENERAL AIRWAY, MLM
Patient has CKD st III. She should not be on Meloxicam or any other NSAIDs/MANRIQUE-2 inhibitors. She can take Tylenol. Patent

## 2022-02-16 ENCOUNTER — EMERGENCY (EMERGENCY)
Facility: HOSPITAL | Age: 32
LOS: 0 days | Discharge: HOME | End: 2022-02-16
Attending: EMERGENCY MEDICINE
Payer: MEDICAID

## 2022-02-16 VITALS — HEIGHT: 62 IN | WEIGHT: 149.91 LBS

## 2022-02-16 VITALS
OXYGEN SATURATION: 100 % | RESPIRATION RATE: 22 BRPM | HEART RATE: 79 BPM | SYSTOLIC BLOOD PRESSURE: 114 MMHG | DIASTOLIC BLOOD PRESSURE: 78 MMHG | TEMPERATURE: 98 F

## 2022-02-16 DIAGNOSIS — Z90.49 ACQUIRED ABSENCE OF OTHER SPECIFIED PARTS OF DIGESTIVE TRACT: Chronic | ICD-10-CM

## 2022-02-16 DIAGNOSIS — R11.0 NAUSEA: ICD-10-CM

## 2022-02-16 DIAGNOSIS — R68.83 CHILLS (WITHOUT FEVER): ICD-10-CM

## 2022-02-16 DIAGNOSIS — R10.13 EPIGASTRIC PAIN: ICD-10-CM

## 2022-02-16 DIAGNOSIS — K29.70 GASTRITIS, UNSPECIFIED, WITHOUT BLEEDING: ICD-10-CM

## 2022-02-16 DIAGNOSIS — Z90.49 ACQUIRED ABSENCE OF OTHER SPECIFIED PARTS OF DIGESTIVE TRACT: ICD-10-CM

## 2022-02-16 LAB
ALBUMIN SERPL ELPH-MCNC: 3.8 G/DL — SIGNIFICANT CHANGE UP (ref 3.3–5)
ALP SERPL-CCNC: 60 U/L — SIGNIFICANT CHANGE UP (ref 40–120)
ALT FLD-CCNC: 33 U/L — SIGNIFICANT CHANGE UP (ref 12–78)
ANION GAP SERPL CALC-SCNC: 6 MMOL/L — SIGNIFICANT CHANGE UP (ref 5–17)
APPEARANCE UR: CLEAR — SIGNIFICANT CHANGE UP
AST SERPL-CCNC: 30 U/L — SIGNIFICANT CHANGE UP (ref 15–37)
BASOPHILS # BLD AUTO: 0.06 K/UL — SIGNIFICANT CHANGE UP (ref 0–0.2)
BASOPHILS NFR BLD AUTO: 0.7 % — SIGNIFICANT CHANGE UP (ref 0–2)
BILIRUB SERPL-MCNC: 0.2 MG/DL — SIGNIFICANT CHANGE UP (ref 0.2–1.2)
BILIRUB UR-MCNC: NEGATIVE — SIGNIFICANT CHANGE UP
BUN SERPL-MCNC: 7 MG/DL — SIGNIFICANT CHANGE UP (ref 7–23)
CALCIUM SERPL-MCNC: 8.9 MG/DL — SIGNIFICANT CHANGE UP (ref 8.5–10.1)
CHLORIDE SERPL-SCNC: 108 MMOL/L — SIGNIFICANT CHANGE UP (ref 96–108)
CO2 SERPL-SCNC: 24 MMOL/L — SIGNIFICANT CHANGE UP (ref 22–31)
COLOR SPEC: YELLOW — SIGNIFICANT CHANGE UP
CREAT SERPL-MCNC: 0.73 MG/DL — SIGNIFICANT CHANGE UP (ref 0.5–1.3)
DIFF PNL FLD: ABNORMAL
EOSINOPHIL # BLD AUTO: 0.26 K/UL — SIGNIFICANT CHANGE UP (ref 0–0.5)
EOSINOPHIL NFR BLD AUTO: 3.1 % — SIGNIFICANT CHANGE UP (ref 0–6)
GLUCOSE SERPL-MCNC: 94 MG/DL — SIGNIFICANT CHANGE UP (ref 70–99)
GLUCOSE UR QL: NEGATIVE — SIGNIFICANT CHANGE UP
HCT VFR BLD CALC: 36.2 % — SIGNIFICANT CHANGE UP (ref 34.5–45)
HGB BLD-MCNC: 11.9 G/DL — SIGNIFICANT CHANGE UP (ref 11.5–15.5)
IMM GRANULOCYTES NFR BLD AUTO: 0.2 % — SIGNIFICANT CHANGE UP (ref 0–1.5)
KETONES UR-MCNC: NEGATIVE — SIGNIFICANT CHANGE UP
LEUKOCYTE ESTERASE UR-ACNC: NEGATIVE — SIGNIFICANT CHANGE UP
LIDOCAIN IGE QN: 84 U/L — SIGNIFICANT CHANGE UP (ref 73–393)
LYMPHOCYTES # BLD AUTO: 3.02 K/UL — SIGNIFICANT CHANGE UP (ref 1–3.3)
LYMPHOCYTES # BLD AUTO: 35.9 % — SIGNIFICANT CHANGE UP (ref 13–44)
MCHC RBC-ENTMCNC: 28.3 PG — SIGNIFICANT CHANGE UP (ref 27–34)
MCHC RBC-ENTMCNC: 32.9 GM/DL — SIGNIFICANT CHANGE UP (ref 32–36)
MCV RBC AUTO: 86 FL — SIGNIFICANT CHANGE UP (ref 80–100)
MONOCYTES # BLD AUTO: 0.78 K/UL — SIGNIFICANT CHANGE UP (ref 0–0.9)
MONOCYTES NFR BLD AUTO: 9.3 % — SIGNIFICANT CHANGE UP (ref 2–14)
NEUTROPHILS # BLD AUTO: 4.28 K/UL — SIGNIFICANT CHANGE UP (ref 1.8–7.4)
NEUTROPHILS NFR BLD AUTO: 50.8 % — SIGNIFICANT CHANGE UP (ref 43–77)
NITRITE UR-MCNC: NEGATIVE — SIGNIFICANT CHANGE UP
PH UR: 7 — SIGNIFICANT CHANGE UP (ref 5–8)
PLATELET # BLD AUTO: 392 K/UL — SIGNIFICANT CHANGE UP (ref 150–400)
POTASSIUM SERPL-MCNC: 4 MMOL/L — SIGNIFICANT CHANGE UP (ref 3.5–5.3)
POTASSIUM SERPL-SCNC: 4 MMOL/L — SIGNIFICANT CHANGE UP (ref 3.5–5.3)
PROT SERPL-MCNC: 8.6 GM/DL — HIGH (ref 6–8.3)
PROT UR-MCNC: NEGATIVE — SIGNIFICANT CHANGE UP
RBC # BLD: 4.21 M/UL — SIGNIFICANT CHANGE UP (ref 3.8–5.2)
RBC # FLD: 14.3 % — SIGNIFICANT CHANGE UP (ref 10.3–14.5)
SODIUM SERPL-SCNC: 138 MMOL/L — SIGNIFICANT CHANGE UP (ref 135–145)
SP GR SPEC: 1 — LOW (ref 1.01–1.02)
UROBILINOGEN FLD QL: NEGATIVE — SIGNIFICANT CHANGE UP
WBC # BLD: 8.42 K/UL — SIGNIFICANT CHANGE UP (ref 3.8–10.5)
WBC # FLD AUTO: 8.42 K/UL — SIGNIFICANT CHANGE UP (ref 3.8–10.5)

## 2022-02-16 PROCEDURE — 96374 THER/PROPH/DIAG INJ IV PUSH: CPT | Mod: XU

## 2022-02-16 PROCEDURE — 74177 CT ABD & PELVIS W/CONTRAST: CPT | Mod: MA

## 2022-02-16 PROCEDURE — 83690 ASSAY OF LIPASE: CPT

## 2022-02-16 PROCEDURE — 36415 COLL VENOUS BLD VENIPUNCTURE: CPT

## 2022-02-16 PROCEDURE — 87086 URINE CULTURE/COLONY COUNT: CPT

## 2022-02-16 PROCEDURE — 96375 TX/PRO/DX INJ NEW DRUG ADDON: CPT

## 2022-02-16 PROCEDURE — 85025 COMPLETE CBC W/AUTO DIFF WBC: CPT

## 2022-02-16 PROCEDURE — 74177 CT ABD & PELVIS W/CONTRAST: CPT | Mod: 26,MA

## 2022-02-16 PROCEDURE — 99285 EMERGENCY DEPT VISIT HI MDM: CPT

## 2022-02-16 PROCEDURE — 80053 COMPREHEN METABOLIC PANEL: CPT

## 2022-02-16 PROCEDURE — 99284 EMERGENCY DEPT VISIT MOD MDM: CPT | Mod: 25

## 2022-02-16 PROCEDURE — 81025 URINE PREGNANCY TEST: CPT

## 2022-02-16 PROCEDURE — 81001 URINALYSIS AUTO W/SCOPE: CPT

## 2022-02-16 RX ORDER — FAMOTIDINE 10 MG/ML
1 INJECTION INTRAVENOUS
Qty: 20 | Refills: 0
Start: 2022-02-16

## 2022-02-16 RX ORDER — MORPHINE SULFATE 50 MG/1
4 CAPSULE, EXTENDED RELEASE ORAL ONCE
Refills: 0 | Status: DISCONTINUED | OUTPATIENT
Start: 2022-02-16 | End: 2022-02-16

## 2022-02-16 RX ORDER — FAMOTIDINE 10 MG/ML
20 INJECTION INTRAVENOUS ONCE
Refills: 0 | Status: COMPLETED | OUTPATIENT
Start: 2022-02-16 | End: 2022-02-16

## 2022-02-16 RX ADMIN — MORPHINE SULFATE 4 MILLIGRAM(S): 50 CAPSULE, EXTENDED RELEASE ORAL at 21:32

## 2022-02-16 RX ADMIN — FAMOTIDINE 20 MILLIGRAM(S): 10 INJECTION INTRAVENOUS at 21:13

## 2022-02-16 RX ADMIN — Medication 30 MILLILITER(S): at 21:13

## 2022-02-16 NOTE — ED STATDOCS - ATTENDING CONTRIBUTION TO CARE
Attending Contribution to Care: I, Coby Bush, performed the initial face to face bedside interview with this patient regarding history of present illness, review of symptoms and relevant past medical, social and family history.  I completed an independent physical examination.  I was the initial provider who evaluated this patient. I have signed out the follow up of any pending tests (i.e. labs, radiological studies) to the ACP.  I have communicated the patient’s plan of care and disposition with the ACP.

## 2022-02-16 NOTE — ED STATDOCS - PHYSICAL EXAMINATION
PA NOTE: GEN: AOX3, NAD. HEENT: Throat clear. Airway is patent. EYES: PERRLA. EOMI. Head: NC/AT. NECK: Supple, No JVD. FROM. C-spine non-tender. CV:S1S2, RRR, LUNGS: Non-labored breathing, no tachypnea. O2sat 100% RA. CTA b/l. No w/r/r. CHEST: Equal chest expansion and rise. No deformity. ABD: Soft, +Mild tenderness epigastric area. NEG Marie. No rebound, no guarding. No CVAT. EXT: No e/c/c. 2+ distal pulses. SKIN: No rashes. NEURO: No focal deficits. CN II-XII intact. FROM. 5/5 motor and sensory. ~Steve Easton PA-C

## 2022-02-16 NOTE — ED STATDOCS - OBJECTIVE STATEMENT
30 y/o female s/p cholecystectomy presents to the ED with epigastric abdominal pain, radiating to the back starting today. Pt also endorsing chills and nausea. No vomiting, no diarrhea. No meds taken PTA. Pt with multiple visits to the ED with similar complaints. Nonsmoker. No EtOH use. LMP: ended yesterday.

## 2022-02-16 NOTE — ED STATDOCS - PROGRESS NOTE DETAILS
CT abd/pel pending. Care transitioned to evening team. ~Steve Easton PA-C Patient still c/o epigastric pain after IV Pepcid, Maalox. Ordered Morphine. Will get CT abd/pel. ~Steve Easton PA-C PA: Patient is a 32 y/o female with PMHx of Gastritis, gall stones s/p cholecystectomy who presents to [facility] c/o epigastric abdominal pain that radiates to the back x 1 day, associated with chills and nausea. DENIES vomiting, diarrhea. No meds taken PTA. Pt has been evaluated in ED multiple times for similar S&S. Nonsmoker. No EtOH use. LMP: ended yesterday. ~Steve Easton PA-C CT negative, pain improved. WIll dc home with pepcid and [facility] referral. - Kole Mills PA-C

## 2022-02-16 NOTE — ED STATDOCS - NSFOLLOWUPINSTRUCTIONS_ED_ALL_ED_FT
GASTRITIS - General Information           Gastritis    LO QUE NECESITA SABER:    ¿Qué es la gastritis?La gastritis es israel inflamación o irritación del revestimiento del estómago.     Órganos abdominales         ¿Qué aumenta mi riesgo de presentar gastritis?  •Infección provocada por bacterias, virus o parásitos      •Analgésicos antiinflamatorios no esteroides, aspirina o medicamentos esteroides      •Uso de productos con tabaco o alcohol      •Traumatismo, radha israel lesión en el estómago o el intestino      •Enfermedades autoinmunes, radha diabetes, enfermedad de la tiroides o enfermedad de Crohn      •Estrés      •Ser mayor de 60 años      •Consumo de drogas ilegales, radha la cocaína      ¿Cuáles son los signos y síntomas de la gastritis?  •Dolor de estómago, ardor o dolor con la palpación      •Sensación de llenura y opresión      •Náuseas o vómitos      •Falta de apetito o rápidamente se siente lleno mientras está comiendo      •Mal aliento      •Fatiga o más cansancio que de costumbre      •Acidez estomacal      ¿Cómo se diagnostica la gastritis?Forrest médico le preguntará sobre karely signos y síntomas y lo examinará. Es posible que usted necesite alguno de los siguientes:   •Los análisis de sangrepueden utilizarse para detectar israel infección, deshidratación o anemia (niveles bajos de glóbulos rojos).      •[place] muestra de materia fecalse puede analizar para detectar la presencia de maribel o gérmenes que podrían estar causando la gastritis.      •[place] prueba del alientopodría mostrar si el H pylori es el causante de forrest gastritis. A usted le darán un líquido para reese. Luego usted respira dentro de israel bolsa. Forrest médico medirá la cantidad de dióxido de carbono en forrest aliento. La cantidad extra de dióxido de carbono puede significar que usted tiene israel infección por H pylori.      •[place] endoscopiapuede utilizarse para buscar irritación o sangrado en el estómago. Forrest médico usará un endoscopio (tubo con israel doreen y cámara en el extremo) airam el procedimiento. Se podría reese israel muestra del estómago para examinarlo.      ¿Cuál es el tratamiento para la gastritis?Karely síntomas podrían desaparecer sin tratamiento. El tratamiento dependerá de lo que le está causando forrest gastritis. Forrest médico le podría recomendar cambios a los medicamentos que yamilex. Los medicamentos podrían ser recetados para ayudar a tratar la infección bacteriana o para disminuir el ácido estomacal.    ¿Cómo puedo controlar o evitar la gastritis?  •No fume.La nicotina y otras sustancias químicas de los cigarrillos y los cigarros pueden empeorar karely síntomas y causar daño pulmonar. Pida información a forrest médico si usted actualmente fuma y necesita ayuda para dejar de fumar. Los cigarrillos electrónicos o el tabaco sin humo igualmente contienen nicotina. Consulte con forrest médico antes de utilizar estos productos.      •No consuma alcohol.El alcohol puede evitar la cicatrización y empeorar la gastritis. Consulte con forrest médico si usted necesita ayuda para dejar de reese alcohol.      •No tome medicamentos YEYO o aspirina a menos que así se lo indiquen.Estos y otros medicamentos similares pueden causar irritación en el revestimiento del estómago. Si forrest médico lo autoriza a reese medicamentos YEYO, tómelos con la comida.      •No coma alimentos que le provocan irritación:Los alimentos radha las naranjas y la salsa pueden causar ardor o dolor. Consuma alimentos saludables y variados. Unos ejemplos incluyen las frutas (no las cítricas), verduras, productos lácteos descremados, legumbres, pan integral al igual que las kash magras y pescado. Trate de comer porciones más pequeñas y reese agua con karely comidas. No coma nada al menos por 3 horas antes de acostarse.      •Encuentre maneras de relajarse y reducir el estrés.El estrés puede aumentar el ácido estomacal y empeorar la gastritis. Las actividades radha el yoga, la meditación o el escuchar música pueden ayudarlo a relajarse. Pase tiempo con amigos, o ifrah cosas que disfruta.      Llame al 911 en fidel de presentar lo siguiente:  •Tiene dolor de pecho o le falta el aire.          ¿Cuándo nisreen buscar atención inmediata?  •Usted vomita maribel.      •Usted tiene evacuaciones intestinales negras o con maribel.      •Usted tiene un mireille dolor de estómago o de espalda.      ¿Cuándo nisreen comunicarme con mi médico?  •Tiene fiebre.      •Usted tiene síntomas nuevos o estos empeoran, aun después del tratamiento.      •Usted tiene preguntas o inquietudes acerca de forrest condición o cuidado.      ACUERDOS SOBRE FORREST CUIDADO:    Usted tiene el derecho de ayudar a planear forrest cuidado. Aprenda todo lo que pueda sobre forrest condición y radha darle tratamiento. Discuta karely opciones de tratamiento con karely médicos para decidir el cuidado que usted desea recibir. Usted siempre tiene el derecho de rechazar el tratamiento.

## 2022-02-16 NOTE — ED ADULT NURSE NOTE - OBJECTIVE STATEMENT
32 y/o female presents to the ED c/o epistaxis. Pt reports 3 days after J&J vaccine, started getting nose bleed, slight lightheadedness LMP: Feb 1. Pt presents today because her main concern was to make sure she does not to have a blood clot.

## 2022-02-16 NOTE — ED STATDOCS - CLINICAL SUMMARY MEDICAL DECISION MAKING FREE TEXT BOX
Epigastric abdominal pain, pt with multiple visits for the same symptoms in the past. gastritis vs. peptic ulcer disease. will check labs, urine and give IV antacids. Epigastric abdominal pain, pt with multiple visits for the same symptoms in the past. gastritis vs. peptic ulcer disease. will check labs, urine and give IV antacids.    PA: Patient presents with epigastric pain x 1 day. Minimal relief from IV pepcid and Maalox. Morphine given. CT abd/pel pending. Care transitioned to evening team. ~Steve Easton PA-C

## 2022-02-16 NOTE — ED STATDOCS - PATIENT PORTAL LINK FT
You can access the FollowMyHealth Patient Portal offered by [facility] by registering at the following website: [email or web address]. By joining [employer]’s FollowMyHealth portal, you will also be able to view your health information using other applications (apps) compatible with our system.

## 2022-02-16 NOTE — ED ADULT NURSE NOTE - NS ED NOTE ABUSE SUSPICION NEGLECT YN
Last Office Visit   4/29/2019  Upcoming: Visit date not found    Last Refill - #30 on 2/18/19 with one refill  Please Advise   No

## 2022-02-16 NOTE — ED STATDOCS - CARE PROVIDER_API CALL
Leonidas Wetzel)  Gastroenterology; Internal Medicine  [address] [place] [address]  Phone: [phone]  Fax: [phone]  Follow Up Time: Urgent

## 2022-03-23 ENCOUNTER — EMERGENCY (EMERGENCY)
Facility: HOSPITAL | Age: 32
LOS: 0 days | Discharge: HOME | End: 2022-03-23
Attending: EMERGENCY MEDICINE
Payer: COMMERCIAL

## 2022-03-23 VITALS
DIASTOLIC BLOOD PRESSURE: 79 MMHG | TEMPERATURE: 98 F | OXYGEN SATURATION: 100 % | HEART RATE: 84 BPM | SYSTOLIC BLOOD PRESSURE: 120 MMHG | RESPIRATION RATE: 17 BRPM

## 2022-03-23 VITALS — HEIGHT: 62 IN
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
## 2022-03-23 DIAGNOSIS — S80.12XA CONTUSION OF LEFT LOWER LEG, INITIAL ENCOUNTER: ICD-10-CM
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
## 2022-03-23 DIAGNOSIS — S89.92XA UNSPECIFIED INJURY OF LEFT LOWER LEG, INITIAL ENCOUNTER: ICD-10-CM

## 2022-03-23 DIAGNOSIS — Z90.49 ACQUIRED ABSENCE OF OTHER SPECIFIED PARTS OF DIGESTIVE TRACT: Chronic | ICD-10-CM
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
## 2022-03-23 PROCEDURE — 99283 EMERGENCY DEPT VISIT LOW MDM: CPT

## 2022-03-23 PROCEDURE — 99283 EMERGENCY DEPT VISIT LOW MDM: CPT | Mod: 25

## 2022-03-23 PROCEDURE — 73590 X-RAY EXAM OF LOWER LEG: CPT | Mod: LT

## 2022-03-23 PROCEDURE — 73590 X-RAY EXAM OF LOWER LEG: CPT | Mod: 26,LT

## 2022-03-23 RX ORDER — ACETAMINOPHEN 500 MG
650 TABLET ORAL ONCE
Refills: 0 | Status: COMPLETED | OUTPATIENT
Start: 2022-03-23 | End: 2022-03-23

## 2022-03-23 RX ADMIN — Medication 650 MILLIGRAM(S): at 16:55

## 2022-03-23 NOTE — ED STATDOCS - NS ED ROS FT
Constitutional: nad, well appearing  HEENT:  no nasal congestion, eye drainage or ear pain.    CVS:  no cp  Resp:  No sob, no cough  GI:  no abdominal pain, no nausea or vomiting  :  no dysuria  MSK: + L shin pain  Skin: no rash  Neuro: no change in mental status or level of consciousness  Heme/lymph: no bleeding
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
## 2022-03-23 NOTE — ED STATDOCS - MUSCULOSKELETAL, MLM
range of motion is not limited and there is no muscle tenderness. LEFT LEG: +Mild tenderness left anterior shin. Painful ROM. No bony deformity. Left knee exam WNL. Able to bear weight without difficulty. 2+ distal pulses.

## 2022-03-23 NOTE — ED STATDOCS - PHYSICAL EXAMINATION
Constitutional: NAD, well appearing  HEENT: no rhinorrhea  CVS:  wwp to LL  Resp:  no resp distress  MSK:  ambulatory to LLE, superficial abrasion to left shin, ttp to anterior mid L shin - no deformity.  Neuro:  A&Ox3, SILT to LLE, nvi  Skin: no rash  psych: clear thought content  Heme/lymph:  No LAD
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
## 2022-03-23 NOTE — ED STATDOCS - CHPI ED SYMPTOM NEG
no chills/no fever/no hematuria/no numbness/no decreased eating/drinking/no dysuria/no pain/no vomiting/no weakness/no palpitations

## 2022-03-23 NOTE — ED STATDOCS - PATIENT PORTAL LINK FT
You can access the FollowMyHealth Patient Portal offered by [facility] by registering at the following website: [email or web address]. By joining [employer]’s FollowMyHealth portal, you will also be able to view your health information using other applications (apps) compatible with our system.

## 2022-03-23 NOTE — ED STATDOCS - NSFOLLOWUPINSTRUCTIONS_ED_ALL_ED_FT
Contusión    Contusion      [place] contusión es un hematoma profundo. Las contusiones son el resultado de un traumatismo cerrado en los tejidos y las fibras musculares que están debajo de la piel. La lesión causa israel hemorragia debajo de la piel. La piel sobre la contusión puede ponerse de color rowdy, chante o amarillo. Las lesiones menores le causarán israel contusión indolora; sin embargo, las lesiones más graves causan contusiones en las que el dolor y la hinchazón pueden prolongarse airam algunas semanas.      Siga estas indicaciones en forrest casa:    Esté atento a cualquier cambio en los síntomas. Informe a forrest médico acerca de karely síntomas. [place] estas medidas para aliviar el dolor.      Control del dolor, el entumecimiento y la hinchazón    •Use reposo, aplicación de hielo y aplicación de presión (compresión), y poner en alto (elevar) la shruthi lesionada. Generalmente, esto se conoce radha la estrategia de RHCE.  •Mantenga la shruthi de la lesión en reposo. Retome karely actividades normales según lo indicado por el médico. Pregúntele al médico qué actividades son seguras para usted.    •Si se lo indican, aplique hielo sobre la shruthi de la lesión:  •Ponga el hielo en israel bolsa plástica.      •Coloque israel toalla entre la piel y la bolsa.      •Coloque el hielo airam 20 minutos, 2 a 3 veces al día.        •Si se lo indican, ejerza israel compresión suave en la shruthi de la lesión con israel venda elástica. Asegúrese de que la venda no esté muy ajustada. Quítese y vuelva a colocarse la venda radha se lo haya indicado el médico.      •Si es posible, cuando esté sentado o acostado, levante (eleve) la shruthi lesionada por encima del nivel del corazón.        Indicaciones generales     •[place] los medicamentos de venta jorden y los recetados solamente radha se lo haya indicado el médico.      •Concurra a todas las visitas de control radha se lo haya indicado el médico. De [place] es importante.        Comuníquese con un médico si:    •Los síntomas no mejoran después de varios días de tratamiento.      •Karely síntomas empeoran.      •Tiene dificultad para  la shruthi lesionada.        Solicite ayuda inmediatamente si:    •Siente dolor intenso.      •Siente adormecimiento en israel mano o un pie.      •La mano o el pie están pálidos o fríos.        Resumen    •[place] contusión es un hematoma profundo.      •Las contusiones son el resultado de un traumatismo cerrado en los tejidos y las fibras musculares que están debajo de la piel.      •El tratamiento es hacer reposo, aplicarse hielo, compresión y elevar la shruthi afectada. Es posible que le den analgésicos de venta jorden.      •Comuníquese con un médico si los síntomas no mejoran o si empeoran.       •Solicite ayuda de inmediato si tiene dolor intenso, entumecimiento o si la shruthi se torna pálida o fría.      Esta información no tiene radha fin reemplazar el consejo del médico. Asegúrese de hacerle al médico cualquier pregunta que tenga.

## 2022-03-23 NOTE — ED STATDOCS - CARE PROVIDER_API CALL
Wendy Patel)  [facility]  [address] [place] [address]  Phone: [phone]  Fax: [phone]  Follow Up Time: Urgent

## 2022-03-23 NOTE — ED ADULT NURSE NOTE - SUICIDE SCREENING QUESTION 3
ANTEPARTUM DISCHARGE SUMMARY:    DISPOSITION STATUS NOTE:  Date: 04/01/20  Mode: ambulated Accompanied by: significant other  Discharge of Patient: home  Discharged in stable condition and undelivered.    Pamphlet / Instructions given:  Attached sheets    Activity:  Activity as tolerated.    Diet:  · General Diet  · Drink 8-10 glasses of water a day  · Don't go more than 8-10 hours without eating or drinking    Warning signs of pregnancy:   · Sudden and/or constant pain  · Vaginal bleeding  · Sudden gush or leaking fluid from the vagina  · Fainting  · Severe nausea and vomiting  · Blurry vision or spots /  Stars before eyes  · Pain and burning with urination  · Chills or fever  · Your baby moves less than usual  · Increase or change in vaginal discharge      Appointment(s) to be kept:  With Dr. Burnett      A copy of this form was provided to and reviewed with the patient/responsible person by: Natalie BENITEZ RN.  Date: 04/01/20          
No

## 2022-03-23 NOTE — ED STATDOCS - CLINICAL SUMMARY MEDICAL DECISION MAKING FREE TEXT BOX
PA note: Xrays NEG for fracture, results discussed in detail with patient. Patient re-examined and re-evaluated. Patient feels much better at this time. ED evaluation, Diagnosis and management discussed with the patient in detail. Workup results discussed with ED attending, OK to [place] home. Close ORTHO follow up encouraged, aftercare to assist with scheduling appointment ASAP. Strict ED return instructions discussed in detail and patient given the opportunity to ask any questions about their discharge diagnosis and instructions. Patient verbalized understanding. ~ Steve Easton PA-C

## 2022-07-21 NOTE — ED ADULT NURSE NOTE - NS ED NOTE ABUSE RESPONSE YN
Pt called [facility] ([facility]). Pt in concerned with increase in bleeding from a small cut on finger. She was offered an appt for tomorrow with dr Elli Rose but no transportation. She was also offered the ED if she is not comfortable waiting until tomorrow. Pt uses [facility] came. Fax orders to them to see if they can go out and draw the blood.      Recommend  [facility]  Ptt  pt Yes

## 2022-10-24 ENCOUNTER — NON-APPOINTMENT (OUTPATIENT)
Age: 32
End: 2022-10-24

## 2023-03-03 ENCOUNTER — OUTPATIENT (OUTPATIENT)
Dept: OUTPATIENT SERVICES | Facility: HOSPITAL | Age: 33
LOS: 1 days | End: 2023-03-03
Payer: COMMERCIAL

## 2023-03-03 ENCOUNTER — APPOINTMENT (OUTPATIENT)
Dept: ULTRASOUND IMAGING | Facility: CLINIC | Age: 33
End: 2023-03-03
Payer: MEDICAID

## 2023-03-03 DIAGNOSIS — R10.2 PELVIC AND PERINEAL PAIN: ICD-10-CM

## 2023-03-03 PROCEDURE — 76830 TRANSVAGINAL US NON-OB: CPT

## 2023-03-03 PROCEDURE — 76830 TRANSVAGINAL US NON-OB: CPT | Mod: 26

## 2023-03-03 PROCEDURE — 76856 US EXAM PELVIC COMPLETE: CPT | Mod: 26

## 2023-03-03 PROCEDURE — 76856 US EXAM PELVIC COMPLETE: CPT

## 2023-09-01 ENCOUNTER — APPOINTMENT (OUTPATIENT)
Dept: RADIOLOGY | Facility: CLINIC | Age: 33
End: 2023-09-01
Payer: MEDICAID

## 2023-09-01 ENCOUNTER — OUTPATIENT (OUTPATIENT)
Dept: OUTPATIENT SERVICES | Facility: HOSPITAL | Age: 33
LOS: 1 days | End: 2023-09-01
Payer: MEDICAID

## 2023-09-01 DIAGNOSIS — M79.601 PAIN IN RIGHT ARM: ICD-10-CM

## 2023-09-01 DIAGNOSIS — Z90.49 ACQUIRED ABSENCE OF OTHER SPECIFIED PARTS OF DIGESTIVE TRACT: Chronic | ICD-10-CM

## 2023-09-01 PROCEDURE — 72040 X-RAY EXAM NECK SPINE 2-3 VW: CPT | Mod: 26

## 2023-09-01 PROCEDURE — 72040 X-RAY EXAM NECK SPINE 2-3 VW: CPT

## 2024-02-09 ENCOUNTER — RESULT REVIEW (OUTPATIENT)
Age: 34
End: 2024-02-09

## 2024-09-16 ENCOUNTER — EMERGENCY (EMERGENCY)
Facility: HOSPITAL | Age: 34
LOS: 0 days | Discharge: HOME | End: 2024-09-17
Attending: STUDENT IN AN ORGANIZED HEALTH CARE EDUCATION/TRAINING PROGRAM
Payer: COMMERCIAL

## 2024-09-16 VITALS — WEIGHT: 140.88 LBS

## 2024-09-16 DIAGNOSIS — N13.30 UNSPECIFIED HYDRONEPHROSIS: ICD-10-CM

## 2024-09-16 DIAGNOSIS — R10.13 EPIGASTRIC PAIN: ICD-10-CM

## 2024-09-16 DIAGNOSIS — R10.12 LEFT UPPER QUADRANT PAIN: ICD-10-CM

## 2024-09-16 DIAGNOSIS — N39.0 URINARY TRACT INFECTION, SITE NOT SPECIFIED: ICD-10-CM

## 2024-09-16 DIAGNOSIS — R68.83 CHILLS (WITHOUT FEVER): ICD-10-CM

## 2024-09-16 PROCEDURE — 93010 ELECTROCARDIOGRAM REPORT: CPT

## 2024-09-16 PROCEDURE — 81001 URINALYSIS AUTO W/SCOPE: CPT

## 2024-09-16 PROCEDURE — 84702 CHORIONIC GONADOTROPIN TEST: CPT

## 2024-09-16 PROCEDURE — 93005 ELECTROCARDIOGRAM TRACING: CPT

## 2024-09-16 PROCEDURE — 96374 THER/PROPH/DIAG INJ IV PUSH: CPT | Mod: XU

## 2024-09-16 PROCEDURE — 36415 COLL VENOUS BLD VENIPUNCTURE: CPT

## 2024-09-16 PROCEDURE — 83690 ASSAY OF LIPASE: CPT

## 2024-09-16 PROCEDURE — 80053 COMPREHEN METABOLIC PANEL: CPT

## 2024-09-16 PROCEDURE — 87086 URINE CULTURE/COLONY COUNT: CPT

## 2024-09-16 PROCEDURE — 74177 CT ABD & PELVIS W/CONTRAST: CPT | Mod: MC

## 2024-09-16 PROCEDURE — 96375 TX/PRO/DX INJ NEW DRUG ADDON: CPT

## 2024-09-16 PROCEDURE — 85025 COMPLETE CBC W/AUTO DIFF WBC: CPT

## 2024-09-16 PROCEDURE — 99285 EMERGENCY DEPT VISIT HI MDM: CPT | Mod: 25

## 2024-09-16 RX ORDER — ACETAMINOPHEN 325 MG/1
975 TABLET ORAL ONCE
Refills: 0 | Status: COMPLETED | OUTPATIENT
Start: 2024-09-16 | End: 2024-09-16

## 2024-09-16 RX ORDER — FAMOTIDINE 10 MG/ML
20 INJECTION INTRAVENOUS ONCE
Refills: 0 | Status: COMPLETED | OUTPATIENT
Start: 2024-09-16 | End: 2024-09-16

## 2024-09-16 RX ORDER — MAGNESIUM, ALUMINUM HYDROXIDE 200-225/5
30 SUSPENSION, ORAL (FINAL DOSE FORM) ORAL ONCE
Refills: 0 | Status: COMPLETED | OUTPATIENT
Start: 2024-09-16 | End: 2024-09-16

## 2024-09-16 NOTE — ED PROVIDER NOTE - CLINICAL SUMMARY MEDICAL DECISION MAKING FREE TEXT BOX
Patient with epigastric and LUQ pain that began this morning. Well appearing. benign abd exam. Stable vitals. Labs, pain control ordered. Patient with epigastric and LUQ pain that began this morning. Well appearing. benign abd exam. Stable vitals. Labs, pain control ordered. Found to have UTI. Right sided hydro on CT scan. Possible kidney stone vs phlebolith. Given cefpodoxime. Rx for oxycodone cefpodoxime and flomax sent. Will f.u with urology outpatient. Ambulating independently in ED. Tolerating PO. Stable for dc. Patient verbalizes understanding of return precautions and f/u.

## 2024-09-16 NOTE — ED PROVIDER NOTE - NSFOLLOWUPINSTRUCTIONS_ED_ALL_ED_FT
1) Jose un seguimiento con forrest médico de atención primaria en los próximos 5 a 7 días.  Por favor llame mañana para israel mariana.  Si no puede realizar un seguimiento con forrest médico de atención primaria, regrese al servicio de urgencias si tiene algún problema urgente.  2) Se le entregó israel copia de las pruebas realizadas hoy.  Traiga los resultados con usted y revíselos con forrest médico de atención primaria.  3) Si adilia síntomas empeoran o tiene alguna otra inquietud, regrese al servicio de urgencias de inmediato.  4) Continúe tomando adilia medicamentos caseros según las indicaciones.

## 2024-09-16 NOTE — ED PROVIDER NOTE - OBJECTIVE STATEMENT
35 yo female with hx of presents to the ED for abdominal pain. Located in the LUQ and epigastric regions. Non radiating. Began this morning and now worsening. Unable to describe quality. Assc with chills. Denies N/V/D, fever. Endorses normal BM this morning. Hx of cholecystectomy.      ID: [id number] & [id number]

## 2024-09-16 NOTE — ED PROVIDER NOTE - CARE PROVIDER_API CALL
Ladarius Bynum Smith  Urology  [address] [place] [address], [address]  [place] [address]  Phone: [phone]  Fax: [phone]  Follow Up Time:

## 2024-09-16 NOTE — ED ADULT TRIAGE NOTE - CHIEF COMPLAINT QUOTE
Pt ambulatory to ED c/o LUQ abdominal pain starting this morning. +chills. Denies n/v/d, fevers. No pertinent medical hx.

## 2024-09-16 NOTE — ED PROVIDER NOTE - PATIENT PORTAL LINK FT
You can access the FollowMyHealth Patient Portal offered by [facility] by registering at the following website: [email or web address]. By joining [employer]’s FollowMyHealth portal, you will also be able to view your health information using other applications (apps) compatible with our system.

## 2024-09-17 VITALS
HEART RATE: 70 BPM | DIASTOLIC BLOOD PRESSURE: 82 MMHG | OXYGEN SATURATION: 100 % | RESPIRATION RATE: 16 BRPM | SYSTOLIC BLOOD PRESSURE: 125 MMHG | TEMPERATURE: 99 F

## 2024-09-17 PROBLEM — Z00.00 ENCOUNTER FOR PREVENTIVE HEALTH EXAMINATION: Status: ACTIVE | Noted: 2024-09-17

## 2024-09-17 LAB
ALBUMIN SERPL ELPH-MCNC: 3.8 G/DL — SIGNIFICANT CHANGE UP (ref 3.3–5)
ALP SERPL-CCNC: 63 U/L — SIGNIFICANT CHANGE UP (ref 40–120)
ALT FLD-CCNC: 23 U/L — SIGNIFICANT CHANGE UP (ref 12–78)
ANION GAP SERPL CALC-SCNC: 6 MMOL/L — SIGNIFICANT CHANGE UP (ref 5–17)
APPEARANCE UR: CLEAR — SIGNIFICANT CHANGE UP
AST SERPL-CCNC: 18 U/L — SIGNIFICANT CHANGE UP (ref 15–37)
BACTERIA # UR AUTO: NEGATIVE /HPF — SIGNIFICANT CHANGE UP
BASOPHILS # BLD AUTO: 0.07 K/UL — SIGNIFICANT CHANGE UP (ref 0–0.2)
BASOPHILS NFR BLD AUTO: 0.7 % — SIGNIFICANT CHANGE UP (ref 0–2)
BILIRUB SERPL-MCNC: 0.2 MG/DL — SIGNIFICANT CHANGE UP (ref 0.2–1.2)
BILIRUB UR-MCNC: NEGATIVE — SIGNIFICANT CHANGE UP
BUN SERPL-MCNC: 8 MG/DL — SIGNIFICANT CHANGE UP (ref 7–23)
CALCIUM SERPL-MCNC: 9 MG/DL — SIGNIFICANT CHANGE UP (ref 8.5–10.1)
CAST: 0 /LPF — SIGNIFICANT CHANGE UP (ref 0–4)
CHLORIDE SERPL-SCNC: 108 MMOL/L — SIGNIFICANT CHANGE UP (ref 96–108)
CO2 SERPL-SCNC: 24 MMOL/L — SIGNIFICANT CHANGE UP (ref 22–31)
COLOR SPEC: YELLOW — SIGNIFICANT CHANGE UP
CREAT SERPL-MCNC: 0.69 MG/DL — SIGNIFICANT CHANGE UP (ref 0.5–1.3)
DIFF PNL FLD: ABNORMAL
EGFR: 117 ML/MIN/1.73M2 — SIGNIFICANT CHANGE UP
EOSINOPHIL # BLD AUTO: 0.16 K/UL — SIGNIFICANT CHANGE UP (ref 0–0.5)
EOSINOPHIL NFR BLD AUTO: 1.5 % — SIGNIFICANT CHANGE UP (ref 0–6)
GLUCOSE SERPL-MCNC: 93 MG/DL — SIGNIFICANT CHANGE UP (ref 70–99)
GLUCOSE UR QL: NEGATIVE MG/DL — SIGNIFICANT CHANGE UP
HCG SERPL-ACNC: <1 MIU/ML — SIGNIFICANT CHANGE UP
HCT VFR BLD CALC: 32.7 % — LOW (ref 34.5–45)
HGB BLD-MCNC: 11 G/DL — LOW (ref 11.5–15.5)
IMM GRANULOCYTES NFR BLD AUTO: 0.3 % — SIGNIFICANT CHANGE UP (ref 0–0.9)
KETONES UR-MCNC: NEGATIVE MG/DL — SIGNIFICANT CHANGE UP
LEUKOCYTE ESTERASE UR-ACNC: ABNORMAL
LIDOCAIN IGE QN: 29 U/L — SIGNIFICANT CHANGE UP (ref 13–75)
LYMPHOCYTES # BLD AUTO: 3.88 K/UL — HIGH (ref 1–3.3)
LYMPHOCYTES # BLD AUTO: 37.6 % — SIGNIFICANT CHANGE UP (ref 13–44)
MCHC RBC-ENTMCNC: 28.9 PG — SIGNIFICANT CHANGE UP (ref 27–34)
MCHC RBC-ENTMCNC: 33.6 GM/DL — SIGNIFICANT CHANGE UP (ref 32–36)
MCV RBC AUTO: 86.1 FL — SIGNIFICANT CHANGE UP (ref 80–100)
MONOCYTES # BLD AUTO: 1.01 K/UL — HIGH (ref 0–0.9)
MONOCYTES NFR BLD AUTO: 9.8 % — SIGNIFICANT CHANGE UP (ref 2–14)
NEUTROPHILS # BLD AUTO: 5.18 K/UL — SIGNIFICANT CHANGE UP (ref 1.8–7.4)
NEUTROPHILS NFR BLD AUTO: 50.1 % — SIGNIFICANT CHANGE UP (ref 43–77)
NITRITE UR-MCNC: NEGATIVE — SIGNIFICANT CHANGE UP
PH UR: 6.5 — SIGNIFICANT CHANGE UP (ref 5–8)
PLATELET # BLD AUTO: 390 K/UL — SIGNIFICANT CHANGE UP (ref 150–400)
POTASSIUM SERPL-MCNC: 3.7 MMOL/L — SIGNIFICANT CHANGE UP (ref 3.5–5.3)
POTASSIUM SERPL-SCNC: 3.7 MMOL/L — SIGNIFICANT CHANGE UP (ref 3.5–5.3)
PROT SERPL-MCNC: 8.3 GM/DL — SIGNIFICANT CHANGE UP (ref 6–8.3)
PROT UR-MCNC: NEGATIVE MG/DL — SIGNIFICANT CHANGE UP
RBC # BLD: 3.8 M/UL — SIGNIFICANT CHANGE UP (ref 3.8–5.2)
RBC # FLD: 14.9 % — HIGH (ref 10.3–14.5)
RBC CASTS # UR COMP ASSIST: 2 /HPF — SIGNIFICANT CHANGE UP (ref 0–4)
SODIUM SERPL-SCNC: 138 MMOL/L — SIGNIFICANT CHANGE UP (ref 135–145)
SP GR SPEC: <1.005 — LOW (ref 1–1.03)
SQUAMOUS # UR AUTO: 8 /HPF — HIGH (ref 0–5)
UROBILINOGEN FLD QL: 0.2 MG/DL — SIGNIFICANT CHANGE UP (ref 0.2–1)
WBC # BLD: 10.33 K/UL — SIGNIFICANT CHANGE UP (ref 3.8–10.5)
WBC # FLD AUTO: 10.33 K/UL — SIGNIFICANT CHANGE UP (ref 3.8–10.5)
WBC UR QL: 11 /HPF — HIGH (ref 0–5)

## 2024-09-17 PROCEDURE — 74177 CT ABD & PELVIS W/CONTRAST: CPT | Mod: 26,MC

## 2024-09-17 RX ORDER — SUCRALFATE 1 G/10ML
1 SUSPENSION ORAL ONCE
Refills: 0 | Status: COMPLETED | OUTPATIENT
Start: 2024-09-17 | End: 2024-09-17

## 2024-09-17 RX ORDER — KETOROLAC TROMETHAMINE 30 MG/ML
15 INJECTION, SOLUTION INTRAMUSCULAR ONCE
Refills: 0 | Status: DISCONTINUED | OUTPATIENT
Start: 2024-09-17 | End: 2024-09-17

## 2024-09-17 RX ORDER — CEFPODOXIME PROXETIL 100 MG/5ML
1 GRANULE, FOR SUSPENSION ORAL
Qty: 14 | Refills: 0
Start: 2024-09-17 | End: 2024-09-23

## 2024-09-17 RX ORDER — TAMSULOSIN HYDROCHLORIDE 0.4 MG/1
1 CAPSULE ORAL
Qty: 7 | Refills: 0
Start: 2024-09-17 | End: 2024-09-23

## 2024-09-17 RX ORDER — CEFPODOXIME PROXETIL 100 MG/5ML
200 GRANULE, FOR SUSPENSION ORAL ONCE
Refills: 0 | Status: COMPLETED | OUTPATIENT
Start: 2024-09-17 | End: 2024-09-17

## 2024-09-17 RX ORDER — OXYCODONE HYDROCHLORIDE 5 MG/1
1 TABLET ORAL
Qty: 12 | Refills: 0
Start: 2024-09-17 | End: 2024-09-19

## 2024-09-17 RX ADMIN — CEFPODOXIME PROXETIL 200 MILLIGRAM(S): 100 GRANULE, FOR SUSPENSION ORAL at 04:19

## 2024-09-17 RX ADMIN — SUCRALFATE 1 GRAM(S): 1 SUSPENSION ORAL at 01:13

## 2024-09-17 RX ADMIN — ACETAMINOPHEN 975 MILLIGRAM(S): 325 TABLET ORAL at 00:21

## 2024-09-17 RX ADMIN — KETOROLAC TROMETHAMINE 15 MILLIGRAM(S): 30 INJECTION, SOLUTION INTRAMUSCULAR at 03:48

## 2024-09-17 RX ADMIN — Medication 30 MILLILITER(S): at 00:22

## 2024-09-17 RX ADMIN — FAMOTIDINE 20 MILLIGRAM(S): 10 INJECTION INTRAVENOUS at 00:22

## 2024-09-17 NOTE — ED ADULT NURSE NOTE - NSFALLUNIVINTERV_ED_ALL_ED
Bed/Stretcher in lowest position, wheels locked, appropriate side rails in place/Call bell, personal items and telephone in reach/Instruct patient to call for assistance before getting out of bed/chair/stretcher/Non-slip footwear applied when patient is off stretcher/[place] to call system/Physically safe environment - no spills, clutter or unnecessary equipment/Purposeful proactive rounding/Room/bathroom lighting operational, light cord in reach

## 2024-09-17 NOTE — ED ADULT NURSE NOTE - CAS ELECT INFOMATION PROVIDED
[id number], Chirag Wallace verbalized understanding off all discharge instructions with no further questions at this time./DC instructions

## 2024-09-17 NOTE — ED ADULT NURSE NOTE - OBJECTIVE STATEMENT
35 y/o female presents to the ED c/o LUQ abdominal pain that started this morning and has gotten progressively worse. Pt endorses chills, denies fever, N/V/D. Pt denies any medical history. Pt reports she took an unknown medication at around 3 PM for the pain with no relief. Pt has no other complaints at this time, safety and comfort maintained.

## 2024-09-18 ENCOUNTER — EMERGENCY (EMERGENCY)
Facility: HOSPITAL | Age: 34
LOS: 0 days | Discharge: HOME | End: 2024-09-19
Attending: EMERGENCY MEDICINE
Payer: COMMERCIAL

## 2024-09-18 ENCOUNTER — APPOINTMENT (OUTPATIENT)
Dept: UROLOGY | Facility: CLINIC | Age: 34
End: 2024-09-18
Payer: COMMERCIAL

## 2024-09-18 ENCOUNTER — EMERGENCY (EMERGENCY)
Facility: HOSPITAL | Age: 34
LOS: 0 days | Discharge: HOME | End: 2024-09-18
Attending: STUDENT IN AN ORGANIZED HEALTH CARE EDUCATION/TRAINING PROGRAM
Payer: COMMERCIAL

## 2024-09-18 VITALS
OXYGEN SATURATION: 100 % | SYSTOLIC BLOOD PRESSURE: 94 MMHG | RESPIRATION RATE: 18 BRPM | TEMPERATURE: 99 F | HEART RATE: 73 BPM | WEIGHT: 143.08 LBS | DIASTOLIC BLOOD PRESSURE: 68 MMHG

## 2024-09-18 VITALS
SYSTOLIC BLOOD PRESSURE: 112 MMHG | TEMPERATURE: 98 F | OXYGEN SATURATION: 100 % | DIASTOLIC BLOOD PRESSURE: 81 MMHG | RESPIRATION RATE: 18 BRPM | HEART RATE: 74 BPM

## 2024-09-18 VITALS
HEIGHT: 60 IN | DIASTOLIC BLOOD PRESSURE: 73 MMHG | WEIGHT: 137 LBS | SYSTOLIC BLOOD PRESSURE: 112 MMHG | OXYGEN SATURATION: 97 % | HEART RATE: 103 BPM | BODY MASS INDEX: 26.9 KG/M2 | RESPIRATION RATE: 14 BRPM | TEMPERATURE: 98 F

## 2024-09-18 VITALS — WEIGHT: 142.2 LBS

## 2024-09-18 DIAGNOSIS — R55 SYNCOPE AND COLLAPSE: ICD-10-CM

## 2024-09-18 DIAGNOSIS — R42 DIZZINESS AND GIDDINESS: ICD-10-CM

## 2024-09-18 DIAGNOSIS — H81.10 BENIGN PAROXYSMAL VERTIGO, UNSPECIFIED EAR: ICD-10-CM

## 2024-09-18 DIAGNOSIS — R11.0 NAUSEA: ICD-10-CM

## 2024-09-18 DIAGNOSIS — D72.829 ELEVATED WHITE BLOOD CELL COUNT, UNSPECIFIED: ICD-10-CM

## 2024-09-18 DIAGNOSIS — R10.13 EPIGASTRIC PAIN: ICD-10-CM

## 2024-09-18 DIAGNOSIS — N20.1 CALCULUS OF URETER: ICD-10-CM

## 2024-09-18 DIAGNOSIS — Z90.49 ACQUIRED ABSENCE OF OTHER SPECIFIED PARTS OF DIGESTIVE TRACT: Chronic | ICD-10-CM

## 2024-09-18 DIAGNOSIS — R10.12 LEFT UPPER QUADRANT PAIN: ICD-10-CM

## 2024-09-18 LAB
ALBUMIN SERPL ELPH-MCNC: 3.8 G/DL — SIGNIFICANT CHANGE UP (ref 3.3–5)
ALBUMIN SERPL ELPH-MCNC: 3.9 G/DL — SIGNIFICANT CHANGE UP (ref 3.3–5)
ALP SERPL-CCNC: 57 U/L — SIGNIFICANT CHANGE UP (ref 40–120)
ALP SERPL-CCNC: 58 U/L — SIGNIFICANT CHANGE UP (ref 40–120)
ALT FLD-CCNC: 22 U/L — SIGNIFICANT CHANGE UP (ref 12–78)
ALT FLD-CCNC: 23 U/L — SIGNIFICANT CHANGE UP (ref 12–78)
ANION GAP SERPL CALC-SCNC: 3 MMOL/L — LOW (ref 5–17)
ANION GAP SERPL CALC-SCNC: 5 MMOL/L — SIGNIFICANT CHANGE UP (ref 5–17)
APPEARANCE UR: CLEAR — SIGNIFICANT CHANGE UP
AST SERPL-CCNC: 16 U/L — SIGNIFICANT CHANGE UP (ref 15–37)
AST SERPL-CCNC: 18 U/L — SIGNIFICANT CHANGE UP (ref 15–37)
BASOPHILS # BLD AUTO: 0.05 K/UL — SIGNIFICANT CHANGE UP (ref 0–0.2)
BASOPHILS # BLD AUTO: 0.08 K/UL — SIGNIFICANT CHANGE UP (ref 0–0.2)
BASOPHILS NFR BLD AUTO: 0.3 % — SIGNIFICANT CHANGE UP (ref 0–2)
BASOPHILS NFR BLD AUTO: 0.6 % — SIGNIFICANT CHANGE UP (ref 0–2)
BILIRUB SERPL-MCNC: 0.2 MG/DL — SIGNIFICANT CHANGE UP (ref 0.2–1.2)
BILIRUB SERPL-MCNC: 0.3 MG/DL — SIGNIFICANT CHANGE UP (ref 0.2–1.2)
BILIRUB UR-MCNC: NEGATIVE — SIGNIFICANT CHANGE UP
BUN SERPL-MCNC: 9 MG/DL — SIGNIFICANT CHANGE UP (ref 7–23)
BUN SERPL-MCNC: 9 MG/DL — SIGNIFICANT CHANGE UP (ref 7–23)
CALCIUM SERPL-MCNC: 9 MG/DL — SIGNIFICANT CHANGE UP (ref 8.5–10.1)
CALCIUM SERPL-MCNC: 9.2 MG/DL — SIGNIFICANT CHANGE UP (ref 8.5–10.1)
CHLORIDE SERPL-SCNC: 104 MMOL/L — SIGNIFICANT CHANGE UP (ref 96–108)
CHLORIDE SERPL-SCNC: 105 MMOL/L — SIGNIFICANT CHANGE UP (ref 96–108)
CO2 SERPL-SCNC: 26 MMOL/L — SIGNIFICANT CHANGE UP (ref 22–31)
CO2 SERPL-SCNC: 26 MMOL/L — SIGNIFICANT CHANGE UP (ref 22–31)
COLOR SPEC: YELLOW — SIGNIFICANT CHANGE UP
CREAT SERPL-MCNC: 0.7 MG/DL — SIGNIFICANT CHANGE UP (ref 0.5–1.3)
CREAT SERPL-MCNC: 0.8 MG/DL — SIGNIFICANT CHANGE UP (ref 0.5–1.3)
CULTURE RESULTS: NO GROWTH — SIGNIFICANT CHANGE UP
DIFF PNL FLD: NEGATIVE — SIGNIFICANT CHANGE UP
EGFR: 100 ML/MIN/1.73M2 — SIGNIFICANT CHANGE UP
EGFR: 117 ML/MIN/1.73M2 — SIGNIFICANT CHANGE UP
EOSINOPHIL # BLD AUTO: 0.08 K/UL — SIGNIFICANT CHANGE UP (ref 0–0.5)
EOSINOPHIL # BLD AUTO: 0.13 K/UL — SIGNIFICANT CHANGE UP (ref 0–0.5)
EOSINOPHIL NFR BLD AUTO: 0.5 % — SIGNIFICANT CHANGE UP (ref 0–6)
EOSINOPHIL NFR BLD AUTO: 1 % — SIGNIFICANT CHANGE UP (ref 0–6)
FLUAV AG NPH QL: SIGNIFICANT CHANGE UP
FLUBV AG NPH QL: SIGNIFICANT CHANGE UP
GLUCOSE SERPL-MCNC: 103 MG/DL — HIGH (ref 70–99)
GLUCOSE SERPL-MCNC: 134 MG/DL — HIGH (ref 70–99)
GLUCOSE UR QL: NEGATIVE MG/DL — SIGNIFICANT CHANGE UP
HCG SERPL-ACNC: <1 MIU/ML — SIGNIFICANT CHANGE UP
HCT VFR BLD CALC: 32.5 % — LOW (ref 34.5–45)
HCT VFR BLD CALC: 34 % — LOW (ref 34.5–45)
HGB BLD-MCNC: 10.8 G/DL — LOW (ref 11.5–15.5)
HGB BLD-MCNC: 11.4 G/DL — LOW (ref 11.5–15.5)
IMM GRANULOCYTES NFR BLD AUTO: 0.3 % — SIGNIFICANT CHANGE UP (ref 0–0.9)
IMM GRANULOCYTES NFR BLD AUTO: 0.4 % — SIGNIFICANT CHANGE UP (ref 0–0.9)
KETONES UR-MCNC: NEGATIVE MG/DL — SIGNIFICANT CHANGE UP
LEUKOCYTE ESTERASE UR-ACNC: NEGATIVE — SIGNIFICANT CHANGE UP
LIDOCAIN IGE QN: 25 U/L — SIGNIFICANT CHANGE UP (ref 13–75)
LYMPHOCYTES # BLD AUTO: 13.3 % — SIGNIFICANT CHANGE UP (ref 13–44)
LYMPHOCYTES # BLD AUTO: 2.25 K/UL — SIGNIFICANT CHANGE UP (ref 1–3.3)
LYMPHOCYTES # BLD AUTO: 2.87 K/UL — SIGNIFICANT CHANGE UP (ref 1–3.3)
LYMPHOCYTES # BLD AUTO: 21.6 % — SIGNIFICANT CHANGE UP (ref 13–44)
MAGNESIUM SERPL-MCNC: 2.2 MG/DL — SIGNIFICANT CHANGE UP (ref 1.6–2.6)
MAGNESIUM SERPL-MCNC: 2.2 MG/DL — SIGNIFICANT CHANGE UP (ref 1.6–2.6)
MCHC RBC-ENTMCNC: 28.9 PG — SIGNIFICANT CHANGE UP (ref 27–34)
MCHC RBC-ENTMCNC: 29.1 PG — SIGNIFICANT CHANGE UP (ref 27–34)
MCHC RBC-ENTMCNC: 33.2 GM/DL — SIGNIFICANT CHANGE UP (ref 32–36)
MCHC RBC-ENTMCNC: 33.5 GM/DL — SIGNIFICANT CHANGE UP (ref 32–36)
MCV RBC AUTO: 86.7 FL — SIGNIFICANT CHANGE UP (ref 80–100)
MCV RBC AUTO: 86.9 FL — SIGNIFICANT CHANGE UP (ref 80–100)
MONOCYTES # BLD AUTO: 0.98 K/UL — HIGH (ref 0–0.9)
MONOCYTES # BLD AUTO: 1.23 K/UL — HIGH (ref 0–0.9)
MONOCYTES NFR BLD AUTO: 7.3 % — SIGNIFICANT CHANGE UP (ref 2–14)
MONOCYTES NFR BLD AUTO: 7.4 % — SIGNIFICANT CHANGE UP (ref 2–14)
NEUTROPHILS # BLD AUTO: 13.23 K/UL — HIGH (ref 1.8–7.4)
NEUTROPHILS # BLD AUTO: 9.16 K/UL — HIGH (ref 1.8–7.4)
NEUTROPHILS NFR BLD AUTO: 69.1 % — SIGNIFICANT CHANGE UP (ref 43–77)
NEUTROPHILS NFR BLD AUTO: 78.2 % — HIGH (ref 43–77)
NITRITE UR-MCNC: NEGATIVE — SIGNIFICANT CHANGE UP
PH UR: 7 — SIGNIFICANT CHANGE UP (ref 5–8)
PLATELET # BLD AUTO: 396 K/UL — SIGNIFICANT CHANGE UP (ref 150–400)
PLATELET # BLD AUTO: 409 K/UL — HIGH (ref 150–400)
POTASSIUM SERPL-MCNC: 3.9 MMOL/L — SIGNIFICANT CHANGE UP (ref 3.5–5.3)
POTASSIUM SERPL-MCNC: 4 MMOL/L — SIGNIFICANT CHANGE UP (ref 3.5–5.3)
POTASSIUM SERPL-SCNC: 3.9 MMOL/L — SIGNIFICANT CHANGE UP (ref 3.5–5.3)
POTASSIUM SERPL-SCNC: 4 MMOL/L — SIGNIFICANT CHANGE UP (ref 3.5–5.3)
PROT SERPL-MCNC: 8.3 GM/DL — SIGNIFICANT CHANGE UP (ref 6–8.3)
PROT SERPL-MCNC: 8.4 GM/DL — HIGH (ref 6–8.3)
PROT UR-MCNC: NEGATIVE MG/DL — SIGNIFICANT CHANGE UP
RBC # BLD: 3.74 M/UL — LOW (ref 3.8–5.2)
RBC # BLD: 3.92 M/UL — SIGNIFICANT CHANGE UP (ref 3.8–5.2)
RBC # FLD: 14.9 % — HIGH (ref 10.3–14.5)
RBC # FLD: 15 % — HIGH (ref 10.3–14.5)
RSV RNA NPH QL NAA+NON-PROBE: SIGNIFICANT CHANGE UP
SARS-COV-2 RNA SPEC QL NAA+PROBE: SIGNIFICANT CHANGE UP
SODIUM SERPL-SCNC: 134 MMOL/L — LOW (ref 135–145)
SODIUM SERPL-SCNC: 135 MMOL/L — SIGNIFICANT CHANGE UP (ref 135–145)
SP GR SPEC: <1.005 — LOW (ref 1–1.03)
SPECIMEN SOURCE: SIGNIFICANT CHANGE UP
TROPONIN I, HIGH SENSITIVITY RESULT: 3.14 NG/L — SIGNIFICANT CHANGE UP
UROBILINOGEN FLD QL: 0.2 MG/DL — SIGNIFICANT CHANGE UP (ref 0.2–1)
WBC # BLD: 13.26 K/UL — HIGH (ref 3.8–10.5)
WBC # BLD: 16.9 K/UL — HIGH (ref 3.8–10.5)
WBC # FLD AUTO: 13.26 K/UL — HIGH (ref 3.8–10.5)
WBC # FLD AUTO: 16.9 K/UL — HIGH (ref 3.8–10.5)

## 2024-09-18 PROCEDURE — 99285 EMERGENCY DEPT VISIT HI MDM: CPT

## 2024-09-18 PROCEDURE — 36000 PLACE NEEDLE IN VEIN: CPT

## 2024-09-18 PROCEDURE — 36000 PLACE NEEDLE IN VEIN: CPT | Mod: XU

## 2024-09-18 PROCEDURE — 83735 ASSAY OF MAGNESIUM: CPT

## 2024-09-18 PROCEDURE — 99284 EMERGENCY DEPT VISIT MOD MDM: CPT

## 2024-09-18 PROCEDURE — 99285 EMERGENCY DEPT VISIT HI MDM: CPT | Mod: 25

## 2024-09-18 PROCEDURE — 71045 X-RAY EXAM CHEST 1 VIEW: CPT | Mod: 26

## 2024-09-18 PROCEDURE — 93010 ELECTROCARDIOGRAM REPORT: CPT

## 2024-09-18 PROCEDURE — 36415 COLL VENOUS BLD VENIPUNCTURE: CPT

## 2024-09-18 PROCEDURE — 70450 CT HEAD/BRAIN W/O DYE: CPT | Mod: 26,MC

## 2024-09-18 PROCEDURE — 71045 X-RAY EXAM CHEST 1 VIEW: CPT

## 2024-09-18 PROCEDURE — 84702 CHORIONIC GONADOTROPIN TEST: CPT

## 2024-09-18 PROCEDURE — 82962 GLUCOSE BLOOD TEST: CPT

## 2024-09-18 PROCEDURE — 74177 CT ABD & PELVIS W/CONTRAST: CPT | Mod: 26,MC

## 2024-09-18 PROCEDURE — 99204 OFFICE O/P NEW MOD 45 MIN: CPT

## 2024-09-18 PROCEDURE — 81003 URINALYSIS AUTO W/O SCOPE: CPT

## 2024-09-18 PROCEDURE — 84484 ASSAY OF TROPONIN QUANT: CPT

## 2024-09-18 PROCEDURE — 80053 COMPREHEN METABOLIC PANEL: CPT

## 2024-09-18 PROCEDURE — 83690 ASSAY OF LIPASE: CPT

## 2024-09-18 PROCEDURE — 74177 CT ABD & PELVIS W/CONTRAST: CPT | Mod: MC

## 2024-09-18 PROCEDURE — 93005 ELECTROCARDIOGRAM TRACING: CPT

## 2024-09-18 PROCEDURE — 70450 CT HEAD/BRAIN W/O DYE: CPT | Mod: MC

## 2024-09-18 PROCEDURE — 85025 COMPLETE CBC W/AUTO DIFF WBC: CPT

## 2024-09-18 PROCEDURE — 99283 EMERGENCY DEPT VISIT LOW MDM: CPT | Mod: 25

## 2024-09-18 PROCEDURE — 0241U: CPT

## 2024-09-18 RX ORDER — MECLIZINE HYDROCHLORIDE 25 MG/1
25 TABLET ORAL ONCE
Refills: 0 | Status: COMPLETED | OUTPATIENT
Start: 2024-09-18 | End: 2024-09-18

## 2024-09-18 RX ORDER — SODIUM CHLORIDE 9 MG/ML
2000 INJECTION INTRAMUSCULAR; INTRAVENOUS; SUBCUTANEOUS ONCE
Refills: 0 | Status: COMPLETED | OUTPATIENT
Start: 2024-09-18 | End: 2024-09-18

## 2024-09-18 RX ORDER — MECLIZINE HYDROCHLORIDE 25 MG/1
1 TABLET ORAL
Qty: 15 | Refills: 0
Start: 2024-09-18 | End: 2024-09-22

## 2024-09-18 RX ORDER — ONDANSETRON 2 MG/ML
4 INJECTION, SOLUTION INTRAMUSCULAR; INTRAVENOUS ONCE
Refills: 0 | Status: COMPLETED | OUTPATIENT
Start: 2024-09-18 | End: 2024-09-18

## 2024-09-18 RX ORDER — ONDANSETRON 2 MG/ML
1 INJECTION, SOLUTION INTRAMUSCULAR; INTRAVENOUS
Qty: 12 | Refills: 0
Start: 2024-09-18

## 2024-09-18 RX ORDER — SODIUM CHLORIDE 9 MG/ML
1000 INJECTION INTRAMUSCULAR; INTRAVENOUS; SUBCUTANEOUS ONCE
Refills: 0 | Status: COMPLETED | OUTPATIENT
Start: 2024-09-18 | End: 2024-09-18

## 2024-09-18 RX ADMIN — SODIUM CHLORIDE 1000 MILLILITER(S): 9 INJECTION INTRAMUSCULAR; INTRAVENOUS; SUBCUTANEOUS at 12:44

## 2024-09-18 NOTE — ED ADULT NURSE NOTE - NSFALLRISKINTERV_ED_ALL_ED

## 2024-09-18 NOTE — ED STATDOCS - NSFOLLOWUPINSTRUCTIONS_ED_ALL_ED_FT
Take meclizine and zofran as prescribed.  STOP TAKING TAMSULOSIN AND OXYCODONE AS YOU NO LONGER HAVE A KIDNEY STONE.  OK to finish antibiotics prescribed 1 day ago for urine.  See your primary care doctor within 1-2 days.    Vietnamese    Vértigo  Vertigo  The outer and inner ear showing the eardrum and ear canal.  El vértigo es la sensación de que usted o las cosas que lo rodean se mueven o giran cuando, en realidad, eso no sucede. Es diferente a tener un mareo. También puede causarle lo siguiente:  Pérdida del equilibrio.  Dificultad para mantenerse de pie o caminar.  Náuseas y vómitos.  Esta sensación puede aparecer y desaparecer en cualquier momento. Puede durar de unos pocos segundos a minutos o incluso horas. Puede desaparecer kaylee o puede tratarse con medicamentos.    ¿Qué tipos de vértigo se presentan?  Hay dos tipos de vértigo:  El vértigo periférico ocurre cuando partes del oído interno no funcionan radha deberían. Maite es el tipo más frecuente.  El vértigo central ocurre cuando el cerebro y la médula dominguez no funcionan radha deberían.  El médico le hará estudios para averiguar qué tipo de vértigo tiene. [place] lo ayudará a decidir cuál es el tratamiento adecuado para usted.    Siga estas instrucciones en forrest casa:  Comida y bebida    Adry suficiente líquido para mantener el pis (orina) de color amarillo pálido.  No adry alcohol.  Actividad    Cuando se levante por la mañana, siéntese nestor en un lado de la cama. Cuando se sienta kenneth, póngase lentamente de pie mientras se sostiene de algo.  Muévase lentamente. No ifrah movimientos bruscos con el cuerpo o con la eri.  Evite algunas posiciones, radha se lo haya indicado el médico.  Use un bastón si tiene dificultad para ponerse de pie o caminar.  Siéntese de inmediato si se siente inestable.  En forrest casa, coloque los objetos de modo que le resulte fácil alcanzarlos sin doblarse o agacharse.  Retome karely actividades habituales cuando se lo indiquen. Pregunte qué cosas son seguras para que ifrah.  Instrucciones generales    Use los medicamentos solamente radha se lo haya indicado el médico.  Comuníquese con un médico si:  Los medicamentos que le indicaron no lo ayudan o empeoran el vértigo.  Tiene síntomas nuevos.  Tiene fiebre.  Tiene náuseas o vómitos.  Karely familiares o amigos notan algún cambio en forrest manera de actuar.  Se le adormece israel parte del cuerpo.  Siente hormigueo y pinchazos en alguna parte del cuerpo.  Tiene arsenio de eri muy intensos.  Solicite ayuda de inmediato si:  Se siente mareado continuamente o se desmaya.  Tiene rigidez en el sonal.  Tiene dificultad para moverse o hablar.  Siente debilidad en las liseth, los brazos o las piernas.  Tiene cambios en la audición o la vista.  Estos síntomas pueden indicar israel emergencia. Llame al 911 de inmediato.  No espere a terry si los síntomas desaparecen.  No conduzca por karely propios medios hasta el hospital.  Esta información no tiene radha fin reemplazar el consejo del médico. Asegúrese de hacerle al médico cualquier pregunta que tenga.    Document Revised: 04/25/2024 Document Reviewed: 04/25/2024  Elsevier Patient Education © 2024 Elsevier Inc.  Elsevier logo  Terms and Conditions  Privacy Policy  Editorial Policy  All content on this site: Copyright © 2024 Elsevier, its licensors, and contributors. All rights are reserved, including those for text and data mining, AI training, and similar technologies. For all open access content, the Creative Commons licensing terms apply.  Cookies are used by this site. To decline or learn more, visit our Cookies page.  [employer]

## 2024-09-18 NOTE — ED PROVIDER NOTE - CLINICAL SUMMARY MEDICAL DECISION MAKING FREE TEXT BOX
Presentation concerning for vasovagal episode. R/o ACS, dehydration, UTI. Plan for EKG, CXR, CT imaging, labs, IVF, monitor and reasses Presentation concerning for vasovagal episode. R/o ACS, dehydration, UTI. Plan for EKG, CXR, CT imaging, labs, IVF, monitor and reasses  EKG shows normal sinus rhythm, no acute ischemic changes.  Labs show mild leukocytosis of 13, H/H within normal limits, troponin within normal limits.  UA not consistent with urinary tract infection.  Chest x-ray normal.  CT brain and CT abdomen pelvis showed no acute actionable findings.  On reassessment patient reports she feels what very well, is neurologically intact and no longer has any symptoms.  Tolerating p.o. fluids.  Patient advised to stay well-hydrated, eat 3 meals a day, instructed to follow-up with primary care doctor and urologist, given strict return precautions to the emergency department DC home in stable condition

## 2024-09-18 NOTE — ED STATDOCS - CLINICAL SUMMARY MEDICAL DECISION MAKING FREE TEXT BOX
33 yo F with dizziness like peripheral vertigo and nausea.  Pregnancy negative and UA negative despite recent passage of kidney stone days ago.  Will give IV hydration, zofran, and meclizine and re-evaluate.

## 2024-09-18 NOTE — ED PROVIDER NOTE - PHYSICAL EXAMINATION
DATE & TIME: 4/20/21 [id number]  Cognitive Concerns/ Orientation: Disoriented to time and situation.   BEHAVIOR & AGGRESSION TOOL COLOR: Yellow - does not understand why he is being held here  ABNL VS/O2: VSS on RA  MOBILITY: Independent in room, SBA in halls. Ambulated x3 this shift.  PAIN MANAGMENT: Denies  DIET: Regular. Needs assistance with ordering meals  BOWEL/BLADDER: Continent  SKIN: Intact/dry   D/C DATE: Discharge pending guardianship, SW following.  OTHER IMPORTANT INFO: Pt is cooperative but very frustrated with hold. Door alarm in place.    Constitutional: well appearing, NAD AAOx3  Eyes: EOMI, PERRL  Head: Normocephalic atraumatic  Mouth: no airway obstruction, posterior oropharynx clear without erythema or exudate  Neck: supple  Cardiac: regular rate and rhythm, no MRG  Resp: Lungs CTAB  GI: Abd soft, epigastric tenderness and LUQ tenderness, no rebound or guarding  Neuro: CN2-12 intact, strength 5/5x4, sensation grossly intact  Skin: No rashes  MSK: LE edema or tenderness

## 2024-09-18 NOTE — ED PROVIDER NOTE - NSFOLLOWUPINSTRUCTIONS_ED_ALL_ED_FT
your EKG, chest x-ray, CT imaging, labs and urine are all normal.  Please stay well-hydrated and eat 3 meals a day.  Follow-up with urologist and your primary care doctor within 1-2 days. Return to the Emergency Department for worsening or persistent symptoms, and/or ANY NEW OR CONCERNING SYMPTOMS. If you have issues obtaining follow up, please call: [phone] or [phone]  to obtain a doctor or specialist who takes your insurance in your area.    forrest electrocardiograma, radiografía de tórax, tomografía computarizada, análisis de laboratorio y orina son normales.  Manténgase kenneth hidratado y coma 3 comidas al día.  Jose un seguimiento con el urólogo y forrest médico de atención primaria dentro de 1 a 2 días. Regrese al Departamento de Emergencias si los síntomas empeoran o persisten, y/o CUALQUIER SÍNTOMA NUEVO O PREOCUPANTE. Si tiene problemas para obtener un seguimiento, llame al: [phone] o al [phone] para obtener un médico o especialista que acepte forrest seguro en forrest área.

## 2024-09-18 NOTE — HISTORY OF PRESENT ILLNESS
[FreeTextEntry1] : 34-year-old female presents with complaints of abdominal pain. She was recently at [facility] where CT (09/17/24) showed Mild right hydroureteronephrosis to the level of the distal ureter with a questionable 2 mm calculus in the distal right ureter versus a phlebolith. She currently denies frequency, urgency, or dysuria. She complains of upper abdominal pain and dizziness.

## 2024-09-18 NOTE — PHYSICAL EXAM
[Normal Appearance] : normal appearance [Well Groomed] : well groomed [General Appearance - In No Acute Distress] : no acute distress [] : no respiratory distress [Respiration, Rhythm And Depth] : normal respiratory rhythm and effort [Exaggerated Use Of Accessory Muscles For Inspiration] : no accessory muscle use [Abdomen Soft] : soft [Costovertebral Angle Tenderness] : no ~M costovertebral angle tenderness [Normal Station and Gait] : the gait and station were normal for the patient's age [No Focal Deficits] : no focal deficits [Oriented To Time, Place, And Person] : oriented to person, place, and time [Mood] : the mood was normal [Affect] : the affect was normal [de-identified] : upper abdominal tenderness

## 2024-09-18 NOTE — ED PROVIDER NOTE - PATIENT PORTAL LINK FT
You can access the FollowMyHealth Patient Portal offered by [facility] by registering at the following website: [email or web address]. By joining [employer]’s FollowMyHealth portal, you will also be able to view your health information using other applications (apps) compatible with our system.

## 2024-09-18 NOTE — ED PROVIDER NOTE - OBJECTIVE STATEMENT
34-year-old female recently diagnosed with a kidney stone who presents today from urology office feeling lightheaded and concerned that she may pass out.     #[id number] 34-year-old female recently diagnosed with a kidney stone who presents today from urology office feeling lightheaded and concerned that she may pass out. Vomited once with associated epigastric burning. No HA, vision changes, numbness tingling weakness, fever, chills, diarrhea, sob, cp, urinary symptoms.      #[id number]

## 2024-09-18 NOTE — ED ADULT TRIAGE NOTE - CHIEF COMPLAINT QUOTE
Pt presents to ED for cc of dizziness, pt seen at urologist 40 minutes ago, reported feeling unbalanced, to one side of her body +diziness present time, +Nausea, +pain to epigastric region, -HA, -slurred speech, Pt ambulatory in Triage, MD Alonzo called for stroke eval, no code stroke called, BGL 91

## 2024-09-18 NOTE — ED PROVIDER NOTE - WR ORDER STATUS 1
Patient here today for 3rd dose of HPV, No contraindications/ Allergies to vaccine. Temporal Temp today at 98.4. Patient tolerated vaccine well, observed and discharged home.   
Resulted

## 2024-09-18 NOTE — ED STATDOCS - PATIENT PORTAL LINK FT
You can access the FollowMyHealth Patient Portal offered by [facility] by registering at the following website: [email or web address]. By joining [employer]’s FollowMyHealth portal, you will also be able to view your health information using other applications (apps) compatible with our system.

## 2024-09-18 NOTE — ED ADULT TRIAGE NOTE - PRO INTERPRETER NEED 2
Taiwanese Siliq Counseling:  I discussed with the patient the risks of Siliq including but not limited to new or worsening depression, suicidal thoughts and behavior, immunosuppression, malignancy, posterior leukoencephalopathy syndrome, and serious infections.  The patient understands that monitoring is required including a PPD at baseline and must alert us or the primary physician if symptoms of infection or other concerning signs are noted. There is also a special program designed to monitor depression which is required with Siliq.

## 2024-09-18 NOTE — ED STATDOCS - CARE PROVIDER_API CALL
Kole Conrad  Family Medicine  [address] [place] [address]  Phone: [phone]  Fax: [phone]  Follow Up Time:

## 2024-09-18 NOTE — ED ADULT NURSE NOTE - OBJECTIVE STATEMENT
Pt presents to ED AOx4 from urologist office c/o sudden onset dizziness, diaphoresis and decrease in balance. Pt c/o epigastric pain and nausea w/o vomiting. Pt states she was recently seen in ED and dx w/ kidney stone. Pmhx of gallbladder removal. Denies chest pain, sob, or fevers/chills.  Interperter #[id number]

## 2024-09-18 NOTE — ED STATDOCS - PROGRESS NOTE DETAILS
Patient still taking antibiotics prescribed 1 day ago.  Will tell her to stop taking oxycodone prescribed for kidney stone and tamsulosin prescribed for kidney stone as she does no longer have a kidney stone.

## 2024-09-18 NOTE — REVIEW OF SYSTEMS
Radiology will fix report in the morning. [Negative] : Heme/Lymph [Chills] : chills [Feeling Tired] : feeling tired [Wake up at night to urinate  How many times?  ___] : wakes up to urinate [unfilled] times during the night [Bladder fullness after urinating] : bladder fullness after urinating [Abdominal Pain] : abdominal pain [see HPI] : see HPI

## 2024-09-18 NOTE — ED STATDOCS - ENMT, MLM
Nasal mucosa clear.  Mouth with normal mucosa  TMs clear; Throat has no vesicles, no oropharyngeal exudates and uvula is midline.

## 2024-09-18 NOTE — REVIEW OF SYSTEMS
[Negative] : Heme/Lymph [Chills] : chills [Feeling Tired] : feeling tired [Wake up at night to urinate  How many times?  ___] : wakes up to urinate [unfilled] times during the night [Bladder fullness after urinating] : bladder fullness after urinating [Abdominal Pain] : abdominal pain [see HPI] : see HPI

## 2024-09-18 NOTE — HISTORY OF PRESENT ILLNESS
[FreeTextEntry1] : 34-year-old female presents with complaints of abdominal pain. She was recently at [facility] where CT (09/17/24) showed Mild right hydroureteronephrosis to the level of the distal ureter with a questionable 2 mm calculus in the distal right ureter versus a phlebolith. She currently denies frequency, urgency, or dysuria. She complains of upper abdominal pain and dizziness.

## 2024-09-18 NOTE — ED STATDOCS - OBJECTIVE STATEMENT
Pt. is a 35 yo F presenting with dizziness, nausea without vomiting, and feeling unsteady like the room is spinning when walking.  Patient states she recently passed a kidney stone.  Now denies any urinary symptoms, back pain, or abdominal pain.  Was in the ED this morning for the same.  Had elevated WBC of 13 but otherwise normal labs, UA, and CT of head and abd/pelvis.  No more kidney stone or infection noted.  Patient denies congestion, ear pain, sore throat or cough. Pt. is a 33 yo F presenting with dizziness, nausea without vomiting, and feeling unsteady like the room is spinning when walking.  Patient states she recently passed a kidney stone.  Now denies any urinary symptoms, back pain, or abdominal pain.  Was in the ED this morning for the same. States she had worsening dizziness after taking one of the pills that was prescribed to her from the ER. Had elevated WBC of 13 but otherwise normal labs, UA, and CT of head and abd/pelvis.  No more kidney stone or infection noted.  Patient denies congestion, ear pain, sore throat or cough.

## 2024-09-18 NOTE — PHYSICAL EXAM
[Normal Appearance] : normal appearance [Well Groomed] : well groomed [General Appearance - In No Acute Distress] : no acute distress [] : no respiratory distress [Respiration, Rhythm And Depth] : normal respiratory rhythm and effort [Exaggerated Use Of Accessory Muscles For Inspiration] : no accessory muscle use [Abdomen Soft] : soft [Costovertebral Angle Tenderness] : no ~M costovertebral angle tenderness [Normal Station and Gait] : the gait and station were normal for the patient's age [No Focal Deficits] : no focal deficits [Oriented To Time, Place, And Person] : oriented to person, place, and time [Affect] : the affect was normal [Mood] : the mood was normal [de-identified] : upper abdominal tenderness

## 2024-09-18 NOTE — ED ADULT TRIAGE NOTE - CHIEF COMPLAINT QUOTE
ambulatory to triage, complains of dizziness and nausea. seen in ED yesterday for same. recent diagnosis of kidney stone.

## 2024-09-18 NOTE — ASSESSMENT
[FreeTextEntry1] : 34-year-old female with right hydronephrosis due to probable 2mm ureteral stone and upper abdominal pain. Due to patient symptoms of pain, dizziness, and diaphoresis.  Patient was advised to return to the ER for possible acute infection.

## 2024-09-19 VITALS
HEART RATE: 72 BPM | TEMPERATURE: 98 F | OXYGEN SATURATION: 100 % | RESPIRATION RATE: 16 BRPM | DIASTOLIC BLOOD PRESSURE: 70 MMHG | SYSTOLIC BLOOD PRESSURE: 100 MMHG

## 2024-09-19 LAB
APPEARANCE: CLEAR
BACTERIA: NEGATIVE /HPF
BILIRUBIN URINE: NEGATIVE
BLOOD URINE: NEGATIVE
CAST: 0 /LPF
COLOR: YELLOW
EPITHELIAL CELLS: 1 /HPF
GLUCOSE BLDC GLUCOMTR-MCNC: 91 MG/DL — SIGNIFICANT CHANGE UP (ref 70–99)
GLUCOSE QUALITATIVE U: NEGATIVE MG/DL
KETONES URINE: NEGATIVE MG/DL
LEUKOCYTE ESTERASE URINE: NEGATIVE
MICROSCOPIC-UA: NORMAL
NITRITE URINE: NEGATIVE
PH URINE: 7
PROTEIN URINE: NEGATIVE MG/DL
RED BLOOD CELLS URINE: 0 /HPF
SPECIFIC GRAVITY URINE: <1.005
UROBILINOGEN URINE: 0.2 MG/DL
WHITE BLOOD CELLS URINE: 0 /HPF

## 2024-09-19 RX ORDER — ONDANSETRON 2 MG/ML
4 INJECTION, SOLUTION INTRAMUSCULAR; INTRAVENOUS ONCE
Refills: 0 | Status: COMPLETED | OUTPATIENT
Start: 2024-09-19 | End: 2024-09-19

## 2024-09-19 RX ADMIN — MECLIZINE HYDROCHLORIDE 25 MILLIGRAM(S): 25 TABLET ORAL at 00:27

## 2024-09-19 RX ADMIN — ONDANSETRON 4 MILLIGRAM(S): 2 INJECTION, SOLUTION INTRAMUSCULAR; INTRAVENOUS at 00:43

## 2024-09-19 NOTE — ED ADULT NURSE NOTE - OBJECTIVE STATEMENT
Pt returns to  ED with c/o dizziness s/p being seen in ED earlier today for kidney stone and taking medications. stone has since passed but pt feeling light headed an nauseous.

## 2024-09-19 NOTE — ED ADULT NURSE NOTE - ALCOHOL PRE SCREEN (AUDIT - C)
Patient arrived from home, c/c sudden onset headache while working out about 6pm, started at base of skull to frontal. Took 800 mg Motrin, pain decreased. Symptoms resolving.   
Statement Selected

## 2024-09-23 LAB — URINE CYTOLOGY: NORMAL

## 2024-09-24 LAB — BACTERIA UR CULT: ABNORMAL

## 2024-09-25 ENCOUNTER — APPOINTMENT (OUTPATIENT)
Dept: UROLOGY | Facility: CLINIC | Age: 34
End: 2024-09-25
Payer: COMMERCIAL

## 2024-09-25 DIAGNOSIS — N13.30 UNSPECIFIED HYDRONEPHROSIS: ICD-10-CM

## 2024-09-25 DIAGNOSIS — R10.10 UPPER ABDOMINAL PAIN, UNSPECIFIED: ICD-10-CM

## 2024-09-25 DIAGNOSIS — R39.89 OTHER SYMPTOMS AND SIGNS INVOLVING THE GENITOURINARY SYSTEM: ICD-10-CM

## 2024-09-25 PROCEDURE — 99212 OFFICE O/P EST SF 10 MIN: CPT

## 2024-09-25 NOTE — ADDENDUM
[FreeTextEntry1] : I, Vaishali Mccall, acted as a scribe on behalf of Dr. Mireles 09/25/2024.   All medical record entries made by Vaishali Mccall were at my, Dr.Kip Mireles, direction and personally dictated by me on  09/25/2024. I have reviewed the chart and agree that the record accurately reflects my personal performance of the history, physical exam, assessment, and plan. I have also personally directed, reviewed, and agreed with the chart.

## 2024-09-25 NOTE — END OF VISIT
[FreeTextEntry3] : Recommendation: Urine sent for culture analysis and cytology. Plan for cystourethroscopy.

## 2024-09-25 NOTE — HISTORY OF PRESENT ILLNESS
[FreeTextEntry1] : ROYCE DE OLIVEIRA is a 34 year female patient was seen in the emergency room where a CT scan was performed, which was essentially normal except for the question of a very small, bright calculus. Urinalysis has been negative on both visits to the emergency room.

## 2024-09-26 LAB
APPEARANCE: CLEAR
BACTERIA: NEGATIVE /HPF
BILIRUBIN URINE: NEGATIVE
BLOOD URINE: NEGATIVE
CAST: 0 /LPF
COLOR: YELLOW
EPITHELIAL CELLS: 3 /HPF
GLUCOSE QUALITATIVE U: NEGATIVE MG/DL
KETONES URINE: NEGATIVE MG/DL
LEUKOCYTE ESTERASE URINE: NEGATIVE
MICROSCOPIC-UA: NORMAL
NITRITE URINE: NEGATIVE
PH URINE: 7
PROTEIN URINE: NEGATIVE MG/DL
RED BLOOD CELLS URINE: NORMAL /HPF
REVIEW: NORMAL
SPECIFIC GRAVITY URINE: 1.01
UROBILINOGEN URINE: 0.2 MG/DL
WHITE BLOOD CELLS URINE: 0 /HPF

## 2024-10-01 LAB — URINE CYTOLOGY: NORMAL

## 2024-11-15 ENCOUNTER — APPOINTMENT (OUTPATIENT)
Dept: UROLOGY | Facility: CLINIC | Age: 34
End: 2024-11-15
Payer: COMMERCIAL

## 2024-11-15 ENCOUNTER — OUTPATIENT (OUTPATIENT)
Dept: OUTPATIENT SERVICES | Facility: HOSPITAL | Age: 34
LOS: 1 days | End: 2024-11-15
Payer: COMMERCIAL

## 2024-11-15 ENCOUNTER — RESULT REVIEW (OUTPATIENT)
Age: 34
End: 2024-11-15

## 2024-11-15 ENCOUNTER — APPOINTMENT (OUTPATIENT)
Dept: UROLOGY | Facility: CLINIC | Age: 34
End: 2024-11-15

## 2024-11-15 ENCOUNTER — APPOINTMENT (OUTPATIENT)
Dept: ULTRASOUND IMAGING | Facility: CLINIC | Age: 34
End: 2024-11-15
Payer: COMMERCIAL

## 2024-11-15 VITALS
TEMPERATURE: 97.5 F | OXYGEN SATURATION: 98 % | HEIGHT: 60 IN | DIASTOLIC BLOOD PRESSURE: 73 MMHG | RESPIRATION RATE: 14 BRPM | BODY MASS INDEX: 26.7 KG/M2 | HEART RATE: 91 BPM | WEIGHT: 136 LBS | SYSTOLIC BLOOD PRESSURE: 107 MMHG

## 2024-11-15 DIAGNOSIS — Z90.49 ACQUIRED ABSENCE OF OTHER SPECIFIED PARTS OF DIGESTIVE TRACT: Chronic | ICD-10-CM

## 2024-11-15 DIAGNOSIS — R39.89 OTHER SYMPTOMS AND SIGNS INVOLVING THE GENITOURINARY SYSTEM: ICD-10-CM

## 2024-11-15 DIAGNOSIS — N92.4 EXCESSIVE BLEEDING IN THE PREMENOPAUSAL PERIOD: ICD-10-CM

## 2024-11-15 PROCEDURE — 76856 US EXAM PELVIC COMPLETE: CPT

## 2024-11-15 PROCEDURE — 52000 CYSTOURETHROSCOPY: CPT

## 2024-11-15 PROCEDURE — 76830 TRANSVAGINAL US NON-OB: CPT | Mod: 26

## 2024-11-15 PROCEDURE — 76830 TRANSVAGINAL US NON-OB: CPT

## 2024-11-15 PROCEDURE — 76856 US EXAM PELVIC COMPLETE: CPT | Mod: 26

## 2024-11-15 RX ORDER — OXYBUTYNIN CHLORIDE 5 MG/1
5 TABLET ORAL
Qty: 30 | Refills: 7 | Status: ACTIVE | COMMUNITY
Start: 2024-11-15 | End: 1900-01-01

## 2024-12-27 NOTE — ED ADULT NURSE NOTE - NSFALLRSKINDICATORS_ED_ALL_ED
Patient called and usman saw pain doctor today is able to get cortisone shot around 01-20 , patient had ultrasound and mri at [facility] , and needs a referral or names for PT   
Returned call to patient informed Dr Dyer is recommending the [facility]. Informed this order has already been placed by [facility] and she should be called to schedule. Patient acknowledged an understanding.  
no

## 2025-01-13 NOTE — ED ADULT TRIAGE NOTE - NS ED TRIAGE AVPU SCALE
GENERAL SURGERY OUTPATIENT POSTOPERATIVE NOTE  [facility]    PATIENT: Judy Frost, 1990, 34 y.o., female    Date of surgery: 12/9/24    CHIEF COMPLAINT:     Chief Complaint   Patient presents with    Post-Op Check     1st p/o lap ronan 12/9/24        History Obtained From:  patient, electronic medical record    HISTORY OF PRESENT ILLNESS:      Judy Frost is a 34 y.o. female presenting postoperatively after laparoscopic cholecystectomy. Since the procedure, she has been doing well.     Eating a regular diet without difficulty.   Bowel movement are Normal.      Wounds/Incisions: are healing well. No drainage or erythema.     I have reviewed the patient's information pertinent to this visit, including medical history, family history, social history and review of systems.    PHYSICAL EXAM:    Vitals:    01/13/25 0950   BP: 120/80   Pulse: 78   SpO2: 99%   Weight: 61.4 kg (135 lb 6.4 oz)   Height: 1.651 m (5' 5\")       Object:  Vital signs and Nurse's note reviewed  Gen:  A&Ox3, NAD  CV: RRR, no m/h/t  Resp: LCTAB, no wheeze or rhonchi  Abd:  Soft, nttp, nd  Wounds: clean, dry and intact; no erythema induration or exudate  Ext:  Warm, no cyanosis or edema    Pertinent laboratory and imaging studies were personally reviewed if available.      IMPRESSION:    Judy Frost is a 34 y.o. female following-up postoperatively from laparoscopic cholecystectomy.    Visit Diagnoses:  1. Generalized abdominal pain    2. Postop check        Patient Active Problem List    Diagnosis Date Noted    RUQ pain 12/06/2024    S/P laparoscopic sleeve gastrectomy 03/28/2023    Generalized abdominal pain 03/28/2023    Chronic diarrhea 03/28/2023    Facial droop     Acute non intractable tension-type headache     Drooping eyelid 12/14/2021    Morbid obesity 11/01/2021    Grade III hemorrhoids 06/30/2021    Morbid obesity with BMI of 40.0-44.9, adult 03/05/2021    Generalized body aches 01/27/2020    Viral URI 01/27/2020     Alert-The patient is alert, awake and responds to voice. The patient is oriented to time, place, and person. The triage nurse is able to obtain subjective information.

## 2025-02-03 NOTE — ED PROCEDURE NOTE - CPROC ED INDICATIONS1
This has been fully explained to the patient, who indicates understanding.  Lab appt scheduled.  Per pt she have a rx for the nu-iron, she dont need a prescription at this time.   fractured extremity

## 2025-04-24 ENCOUNTER — RESULT REVIEW (OUTPATIENT)
Age: 35
End: 2025-04-24

## 2025-06-26 ENCOUNTER — APPOINTMENT (OUTPATIENT)
Dept: OTOLARYNGOLOGY | Facility: CLINIC | Age: 35
End: 2025-06-26
Payer: COMMERCIAL

## 2025-06-26 ENCOUNTER — NON-APPOINTMENT (OUTPATIENT)
Age: 35
End: 2025-06-26

## 2025-06-26 VITALS
HEIGHT: 60 IN | DIASTOLIC BLOOD PRESSURE: 73 MMHG | BODY MASS INDEX: 28.66 KG/M2 | SYSTOLIC BLOOD PRESSURE: 107 MMHG | WEIGHT: 146 LBS

## 2025-06-26 PROCEDURE — 92567 TYMPANOMETRY: CPT

## 2025-06-26 PROCEDURE — 92557 COMPREHENSIVE HEARING TEST: CPT

## 2025-06-26 PROCEDURE — 99203 OFFICE O/P NEW LOW 30 MIN: CPT | Mod: 25

## 2025-06-26 RX ORDER — UBROGEPANT 100 MG/1
TABLET ORAL
Refills: 0 | Status: ACTIVE | COMMUNITY